# Patient Record
Sex: FEMALE | Race: WHITE | NOT HISPANIC OR LATINO
[De-identification: names, ages, dates, MRNs, and addresses within clinical notes are randomized per-mention and may not be internally consistent; named-entity substitution may affect disease eponyms.]

---

## 2017-10-20 ENCOUNTER — NON-APPOINTMENT (OUTPATIENT)
Age: 22
End: 2017-10-20

## 2017-10-20 ENCOUNTER — APPOINTMENT (OUTPATIENT)
Dept: ELECTROPHYSIOLOGY | Facility: CLINIC | Age: 22
End: 2017-10-20
Payer: COMMERCIAL

## 2017-10-20 VITALS — SYSTOLIC BLOOD PRESSURE: 143 MMHG | DIASTOLIC BLOOD PRESSURE: 82 MMHG | HEART RATE: 73 BPM

## 2017-10-20 PROCEDURE — 99215 OFFICE O/P EST HI 40 MIN: CPT

## 2017-10-20 PROCEDURE — 93000 ELECTROCARDIOGRAM COMPLETE: CPT

## 2017-12-04 ENCOUNTER — OUTPATIENT (OUTPATIENT)
Dept: OUTPATIENT SERVICES | Facility: HOSPITAL | Age: 22
LOS: 1 days | End: 2017-12-04
Payer: COMMERCIAL

## 2017-12-04 VITALS
TEMPERATURE: 98 F | HEART RATE: 68 BPM | DIASTOLIC BLOOD PRESSURE: 84 MMHG | WEIGHT: 139.99 LBS | RESPIRATION RATE: 18 BRPM | HEIGHT: 66 IN | SYSTOLIC BLOOD PRESSURE: 120 MMHG | OXYGEN SATURATION: 98 %

## 2017-12-04 DIAGNOSIS — I49.9 CARDIAC ARRHYTHMIA, UNSPECIFIED: ICD-10-CM

## 2017-12-04 DIAGNOSIS — Z01.818 ENCOUNTER FOR OTHER PREPROCEDURAL EXAMINATION: ICD-10-CM

## 2017-12-04 LAB
ALBUMIN SERPL ELPH-MCNC: 4.6 G/DL — SIGNIFICANT CHANGE UP (ref 3.3–5)
ALP SERPL-CCNC: 47 U/L — SIGNIFICANT CHANGE UP (ref 40–120)
ALT FLD-CCNC: 16 U/L RC — SIGNIFICANT CHANGE UP (ref 10–45)
ANION GAP SERPL CALC-SCNC: 11 MMOL/L — SIGNIFICANT CHANGE UP (ref 5–17)
APTT BLD: 29.5 SEC — SIGNIFICANT CHANGE UP (ref 27.5–37.4)
AST SERPL-CCNC: 19 U/L — SIGNIFICANT CHANGE UP (ref 10–40)
BILIRUB SERPL-MCNC: 0.3 MG/DL — SIGNIFICANT CHANGE UP (ref 0.2–1.2)
BLD GP AB SCN SERPL QL: NEGATIVE — SIGNIFICANT CHANGE UP
BUN SERPL-MCNC: 19 MG/DL — SIGNIFICANT CHANGE UP (ref 7–23)
CALCIUM SERPL-MCNC: 9.5 MG/DL — SIGNIFICANT CHANGE UP (ref 8.4–10.5)
CHLORIDE SERPL-SCNC: 101 MMOL/L — SIGNIFICANT CHANGE UP (ref 96–108)
CO2 SERPL-SCNC: 26 MMOL/L — SIGNIFICANT CHANGE UP (ref 22–31)
CREAT SERPL-MCNC: 0.68 MG/DL — SIGNIFICANT CHANGE UP (ref 0.5–1.3)
GLUCOSE SERPL-MCNC: 96 MG/DL — SIGNIFICANT CHANGE UP (ref 70–99)
HCG SERPL QL: NEGATIVE — SIGNIFICANT CHANGE UP
HCT VFR BLD CALC: 41.9 % — SIGNIFICANT CHANGE UP (ref 34.5–45)
HGB BLD-MCNC: 13.8 G/DL — SIGNIFICANT CHANGE UP (ref 11.5–15.5)
INR BLD: 1.03 RATIO — SIGNIFICANT CHANGE UP (ref 0.88–1.16)
MCHC RBC-ENTMCNC: 29.6 PG — SIGNIFICANT CHANGE UP (ref 27–34)
MCHC RBC-ENTMCNC: 32.9 GM/DL — SIGNIFICANT CHANGE UP (ref 32–36)
MCV RBC AUTO: 89.8 FL — SIGNIFICANT CHANGE UP (ref 80–100)
PLATELET # BLD AUTO: 278 K/UL — SIGNIFICANT CHANGE UP (ref 150–400)
POTASSIUM SERPL-MCNC: 4.3 MMOL/L — SIGNIFICANT CHANGE UP (ref 3.5–5.3)
POTASSIUM SERPL-SCNC: 4.3 MMOL/L — SIGNIFICANT CHANGE UP (ref 3.5–5.3)
PROT SERPL-MCNC: 7.8 G/DL — SIGNIFICANT CHANGE UP (ref 6–8.3)
PROTHROM AB SERPL-ACNC: 11.2 SEC — SIGNIFICANT CHANGE UP (ref 9.8–12.7)
RBC # BLD: 4.67 M/UL — SIGNIFICANT CHANGE UP (ref 3.8–5.2)
RBC # FLD: 12.7 % — SIGNIFICANT CHANGE UP (ref 10.3–14.5)
RH IG SCN BLD-IMP: POSITIVE — SIGNIFICANT CHANGE UP
SODIUM SERPL-SCNC: 138 MMOL/L — SIGNIFICANT CHANGE UP (ref 135–145)
WBC # BLD: 5.2 K/UL — SIGNIFICANT CHANGE UP (ref 3.8–10.5)
WBC # FLD AUTO: 5.2 K/UL — SIGNIFICANT CHANGE UP (ref 3.8–10.5)

## 2017-12-04 PROCEDURE — 93005 ELECTROCARDIOGRAM TRACING: CPT

## 2017-12-04 PROCEDURE — 84703 CHORIONIC GONADOTROPIN ASSAY: CPT

## 2017-12-04 PROCEDURE — 85027 COMPLETE CBC AUTOMATED: CPT

## 2017-12-04 PROCEDURE — 80053 COMPREHEN METABOLIC PANEL: CPT

## 2017-12-04 PROCEDURE — 86850 RBC ANTIBODY SCREEN: CPT

## 2017-12-04 PROCEDURE — 85610 PROTHROMBIN TIME: CPT

## 2017-12-04 PROCEDURE — 85730 THROMBOPLASTIN TIME PARTIAL: CPT

## 2017-12-04 PROCEDURE — 86901 BLOOD TYPING SEROLOGIC RH(D): CPT

## 2017-12-04 PROCEDURE — 86900 BLOOD TYPING SEROLOGIC ABO: CPT

## 2017-12-04 PROCEDURE — 93010 ELECTROCARDIOGRAM REPORT: CPT

## 2017-12-04 NOTE — H&P CARDIOLOGY - PMH
Ovarian cyst    SVT (supraventricular tachycardia) Ovarian cyst    Palpitation    SVT (supraventricular tachycardia)

## 2017-12-04 NOTE — H&P CARDIOLOGY - HISTORY OF PRESENT ILLNESS
22 year old female 22 year old female with PMHX of syncope presents for PST for WPW SVT ablation tomorrow. pt reports she had a syncopal episode about 3 years ago then having GUNTER and palpitation with working out few minutes, evaluated by Dr. Blanco and advised to have ablation. pt denies palpitation or chest pain currently.

## 2017-12-05 ENCOUNTER — TRANSCRIPTION ENCOUNTER (OUTPATIENT)
Age: 22
End: 2017-12-05

## 2017-12-05 ENCOUNTER — INPATIENT (INPATIENT)
Facility: HOSPITAL | Age: 22
LOS: 0 days | Discharge: HOME | DRG: 274 | End: 2017-12-06
Attending: INTERNAL MEDICINE | Admitting: INTERNAL MEDICINE
Payer: COMMERCIAL

## 2017-12-05 VITALS
SYSTOLIC BLOOD PRESSURE: 107 MMHG | TEMPERATURE: 98 F | RESPIRATION RATE: 16 BRPM | HEART RATE: 86 BPM | OXYGEN SATURATION: 93 % | DIASTOLIC BLOOD PRESSURE: 56 MMHG

## 2017-12-05 DIAGNOSIS — I49.9 CARDIAC ARRHYTHMIA, UNSPECIFIED: ICD-10-CM

## 2017-12-05 LAB
BLD GP AB SCN SERPL QL: NEGATIVE — SIGNIFICANT CHANGE UP
RH IG SCN BLD-IMP: POSITIVE — SIGNIFICANT CHANGE UP

## 2017-12-05 PROCEDURE — 93653 COMPRE EP EVAL TX SVT: CPT

## 2017-12-05 PROCEDURE — 93010 ELECTROCARDIOGRAM REPORT: CPT

## 2017-12-05 PROCEDURE — 93613 INTRACARDIAC EPHYS 3D MAPG: CPT

## 2017-12-05 NOTE — DISCHARGE NOTE ADULT - CARE PLAN
Principal Discharge DX:	SVT (supraventricular tachycardia)  Goal:	Pt will be free from lightheadedness, dizziness, chest pain, palpitations or SOB  Instructions for follow-up, activity and diet:	No heavy lifting more than 10lbs. No rigourous or strenuous activity for 24- 48 hours. Principal Discharge DX:	SVT (supraventricular tachycardia)  Goal:	Pt will be free from lightheadedness, dizziness, chest pain, palpitations or SOB  Instructions for follow-up, activity and diet:	S/P Ablation  No heavy lifting more than 10lbs. No rigourous or strenuous activity for 24- 48 hours. Avoid pools or bath x 1 week. May resume birth control contraceptives in one month post procedure.

## 2017-12-05 NOTE — DISCHARGE NOTE ADULT - CARE PROVIDER_API CALL
Raymon Blanco), Cardiac Electrophysiology; Cardiovascular Disease  [address], [place] [id number]  Phone: [phone]  Fax: [phone]

## 2017-12-05 NOTE — DISCHARGE NOTE ADULT - PLAN OF CARE
Pt will be free from lightheadedness, dizziness, chest pain, palpitations or SOB No heavy lifting more than 10lbs. No rigourous or strenuous activity for 24- 48 hours. S/P Ablation  No heavy lifting more than 10lbs. No rigourous or strenuous activity for 24- 48 hours. Avoid pools or bath x 1 week. May resume birth control contraceptives in one month post procedure.

## 2017-12-05 NOTE — DISCHARGE NOTE ADULT - HOSPITAL COURSE
Patient is a 22 year old female with PMHX of syncope presents for PST for WPW SVT ablation. Pt is now S/P SVT ablation. Pt tolerate procedure well. VS stable.  Telemetery revealed  Labs stable. Pt to follow up with Dr. Blanco (EP clinic) on 1/15/ 18 @ 11: 15 am. Patient is a 22 year old female with PMHX of syncope presents for PST for WPW SVT ablation. Pt is now S/P SVT ablation via Right groin. No bleeding or swelling noted. Pt tolerated procedure well and is now stable for discharge. VS stable. Labs stable.  Telemetry revealed SR with heart rates 70- 100's.  Post procedure  discharge instructions given. Pt to follow up with Dr. Blanco (EP clinic) on 1/15/ 18 @ 11: 15 am.

## 2017-12-05 NOTE — PATIENT PROFILE ADULT. - VISION (WITH CORRECTIVE LENSES IF THE PATIENT USUALLY WEARS THEM):
Normal vision: sees adequately in most situations; can see medication labels, newsprint/Contact lenses at bedside

## 2017-12-05 NOTE — DISCHARGE NOTE ADULT - CONDITIONS AT DISCHARGE
vss,  pt in no pain or distress, pt aware to seek immediate help if swelling/bleeding is noted at Cath site, or if right leg goes numb. right groin ablation site benign with no hematoma, no bleeding noted, extremity warm, mobile with positive pulses.

## 2017-12-05 NOTE — DISCHARGE NOTE ADULT - PATIENT PORTAL LINK FT
“You can access the [facility] Patient Portal, offered by , by registering with the following website: [email or web address]”

## 2017-12-06 VITALS
OXYGEN SATURATION: 98 % | SYSTOLIC BLOOD PRESSURE: 127 MMHG | DIASTOLIC BLOOD PRESSURE: 80 MMHG | TEMPERATURE: 99 F | RESPIRATION RATE: 19 BRPM | HEART RATE: 76 BPM

## 2017-12-06 DIAGNOSIS — I47.1 SUPRAVENTRICULAR TACHYCARDIA: ICD-10-CM

## 2017-12-06 LAB
ANION GAP SERPL CALC-SCNC: 11 MMOL/L — SIGNIFICANT CHANGE UP (ref 5–17)
BUN SERPL-MCNC: 13 MG/DL — SIGNIFICANT CHANGE UP (ref 7–23)
CALCIUM SERPL-MCNC: 9.2 MG/DL — SIGNIFICANT CHANGE UP (ref 8.4–10.5)
CHLORIDE SERPL-SCNC: 105 MMOL/L — SIGNIFICANT CHANGE UP (ref 96–108)
CO2 SERPL-SCNC: 24 MMOL/L — SIGNIFICANT CHANGE UP (ref 22–31)
CREAT SERPL-MCNC: 0.59 MG/DL — SIGNIFICANT CHANGE UP (ref 0.5–1.3)
GLUCOSE SERPL-MCNC: 101 MG/DL — HIGH (ref 70–99)
HCT VFR BLD CALC: 38.4 % — SIGNIFICANT CHANGE UP (ref 34.5–45)
HGB BLD-MCNC: 12.9 G/DL — SIGNIFICANT CHANGE UP (ref 11.5–15.5)
MCHC RBC-ENTMCNC: 30.4 PG — SIGNIFICANT CHANGE UP (ref 27–34)
MCHC RBC-ENTMCNC: 33.5 GM/DL — SIGNIFICANT CHANGE UP (ref 32–36)
MCV RBC AUTO: 90.5 FL — SIGNIFICANT CHANGE UP (ref 80–100)
PLATELET # BLD AUTO: 230 K/UL — SIGNIFICANT CHANGE UP (ref 150–400)
POTASSIUM SERPL-MCNC: 4.2 MMOL/L — SIGNIFICANT CHANGE UP (ref 3.5–5.3)
POTASSIUM SERPL-SCNC: 4.2 MMOL/L — SIGNIFICANT CHANGE UP (ref 3.5–5.3)
RBC # BLD: 4.24 M/UL — SIGNIFICANT CHANGE UP (ref 3.8–5.2)
RBC # FLD: 12.7 % — SIGNIFICANT CHANGE UP (ref 10.3–14.5)
SODIUM SERPL-SCNC: 140 MMOL/L — SIGNIFICANT CHANGE UP (ref 135–145)
WBC # BLD: 5.6 K/UL — SIGNIFICANT CHANGE UP (ref 3.8–10.5)
WBC # FLD AUTO: 5.6 K/UL — SIGNIFICANT CHANGE UP (ref 3.8–10.5)

## 2017-12-06 PROCEDURE — 93613 INTRACARDIAC EPHYS 3D MAPG: CPT

## 2017-12-06 PROCEDURE — 93005 ELECTROCARDIOGRAM TRACING: CPT

## 2017-12-06 PROCEDURE — C1894: CPT

## 2017-12-06 PROCEDURE — 93010 ELECTROCARDIOGRAM REPORT: CPT

## 2017-12-06 PROCEDURE — 93653 COMPRE EP EVAL TX SVT: CPT

## 2017-12-06 PROCEDURE — C1766: CPT

## 2017-12-06 PROCEDURE — C1732: CPT

## 2017-12-06 PROCEDURE — 99233 SBSQ HOSP IP/OBS HIGH 50: CPT

## 2017-12-06 PROCEDURE — C1730: CPT

## 2017-12-06 PROCEDURE — 86901 BLOOD TYPING SEROLOGIC RH(D): CPT

## 2017-12-06 PROCEDURE — 86900 BLOOD TYPING SEROLOGIC ABO: CPT

## 2017-12-06 PROCEDURE — 86850 RBC ANTIBODY SCREEN: CPT

## 2017-12-06 PROCEDURE — 80048 BASIC METABOLIC PNL TOTAL CA: CPT

## 2017-12-06 PROCEDURE — 85027 COMPLETE CBC AUTOMATED: CPT

## 2017-12-06 NOTE — PROGRESS NOTE ADULT - PROBLEM SELECTOR PLAN 1
S/P SVT ablation on 12/5  Monitor right groin for any bleeding or swelling  Post ablation discharge instructions   Discharge planning for today and pt to follow up with Dr. Blanco on 1/5/18 at 11: 05 am

## 2017-12-06 NOTE — PROGRESS NOTE ADULT - ASSESSMENT
Patient is a 22 year old female with PMHX of syncope presented for PST for WPW SVT ablation. Now s/p SVT ablation on 12/5, pt tolerated procedure well.

## 2017-12-06 NOTE — PROGRESS NOTE ADULT - SUBJECTIVE AND OBJECTIVE BOX
INTERVAL HPI/OVERNIGHT EVENTS: Resting comfortably in bed, no complaints voiced    MEDICATIONS  (STANDING):    MEDICATIONS  (PRN):      Allergies    penicillin (Anaphylaxis)    Intolerances      ROS:  General: Pt denies fever/chills  Cardiovascular: denies chest pain/palpitations/leg edema  Respiratory and Thorax: denies SOB/cough/wheezing  Gastrointestinal: denies abdominal pain/diarrhea/constipation/bloody stool  Musculoskeletal: denies joint pain or swelling, denies restricted motion  Skin: denies rashes/sores  Hematologic: denies abnormal bleeding    Vital Signs Last 24 Hrs  T(C): 36.6 (06 Dec 2017 04:27), Max: 36.7 (05 Dec 2017 19:59)  T(F): 97.9 (06 Dec 2017 04:27), Max: 98.1 (05 Dec 2017 19:59)  HR: 66 (06 Dec 2017 08:14) (66 - 88)  BP: 95/49 (06 Dec 2017 08:14) (95/49 - 123/66)  BP(mean): 74 (05 Dec 2017 16:30) (69 - 87)  RR: 18 (06 Dec 2017 04:27) (14 - 19)  SpO2: 98% (06 Dec 2017 04:27) (95% - 100%)      Physical Exam:  Neurological: Alert & Oriented x 3  Respiratory: CTA B/L, No wheezing/crackles/rhonchi  Cardiovascular: (+) S1 & S2  Gastrointestinal: soft, NT, nondistended, (+) BS  Extremities: No pedal edema, No clubbing, No cyanosis  Skin:  Right groin no bleeding or swelling    LABS:                        12.9   5.6   )-----------( 230      ( 06 Dec 2017 06:35 )             38.4     12-06    140  |  105  |  13  ----------------------------<  101<H>  4.2   |  24  |  0.59    Ca    9.2      06 Dec 2017 06:35    TPro  7.8  /  Alb  4.6  /  TBili  0.3  /  DBili  x   /  AST  19  /  ALT  16  /  AlkPhos  47  12-04    PT/INR - ( 04 Dec 2017 11:27 )   PT: 11.2 sec;   INR: 1.03 ratio         PTT - ( 04 Dec 2017 11:27 )  PTT:29.5 sec      RADIOLOGY & ADDITIONAL TESTS:    TELE: SR with heart rate 70- 100's    EKG: SR

## 2017-12-07 ENCOUNTER — TRANSCRIPTION ENCOUNTER (OUTPATIENT)
Age: 22
End: 2017-12-07

## 2017-12-18 ENCOUNTER — APPOINTMENT (OUTPATIENT)
Dept: ELECTROPHYSIOLOGY | Facility: CLINIC | Age: 22
End: 2017-12-18
Payer: COMMERCIAL

## 2017-12-18 ENCOUNTER — NON-APPOINTMENT (OUTPATIENT)
Age: 22
End: 2017-12-18

## 2017-12-18 VITALS — DIASTOLIC BLOOD PRESSURE: 74 MMHG | SYSTOLIC BLOOD PRESSURE: 116 MMHG | HEART RATE: 59 BPM

## 2017-12-18 PROCEDURE — 99215 OFFICE O/P EST HI 40 MIN: CPT

## 2017-12-18 PROCEDURE — 93000 ELECTROCARDIOGRAM COMPLETE: CPT

## 2017-12-18 RX ORDER — NORGESTIMATE AND ETHINYL ESTRADIOL 0.25-0.035
0.25-35 KIT ORAL
Refills: 0 | Status: DISCONTINUED | COMMUNITY
End: 2017-12-18

## 2017-12-26 ENCOUNTER — APPOINTMENT (OUTPATIENT)
Dept: CV DIAGNOSITCS | Facility: HOSPITAL | Age: 22
End: 2017-12-26

## 2017-12-26 ENCOUNTER — OUTPATIENT (OUTPATIENT)
Dept: OUTPATIENT SERVICES | Facility: HOSPITAL | Age: 22
LOS: 1 days | End: 2017-12-26
Payer: COMMERCIAL

## 2017-12-26 DIAGNOSIS — R00.2 PALPITATIONS: ICD-10-CM

## 2017-12-26 PROCEDURE — 93306 TTE W/DOPPLER COMPLETE: CPT

## 2017-12-26 PROCEDURE — 93306 TTE W/DOPPLER COMPLETE: CPT | Mod: 26

## 2018-01-05 ENCOUNTER — APPOINTMENT (OUTPATIENT)
Dept: ELECTROPHYSIOLOGY | Facility: CLINIC | Age: 23
End: 2018-01-05

## 2018-01-31 ENCOUNTER — RESULT REVIEW (OUTPATIENT)
Age: 23
End: 2018-01-31

## 2018-12-17 ENCOUNTER — TRANSCRIPTION ENCOUNTER (OUTPATIENT)
Age: 23
End: 2018-12-17

## 2018-12-17 ENCOUNTER — INPATIENT (INPATIENT)
Facility: HOSPITAL | Age: 23
LOS: 2 days | Discharge: HOME | DRG: 853 | End: 2018-12-20
Attending: SURGERY | Admitting: SURGERY
Payer: COMMERCIAL

## 2018-12-17 VITALS
OXYGEN SATURATION: 98 % | SYSTOLIC BLOOD PRESSURE: 114 MMHG | RESPIRATION RATE: 20 BRPM | WEIGHT: 149.91 LBS | HEART RATE: 104 BPM | DIASTOLIC BLOOD PRESSURE: 70 MMHG | HEIGHT: 66 IN | TEMPERATURE: 100 F

## 2018-12-17 PROCEDURE — 99285 EMERGENCY DEPT VISIT HI MDM: CPT | Mod: 25

## 2018-12-18 ENCOUNTER — RESULT REVIEW (OUTPATIENT)
Age: 23
End: 2018-12-18

## 2018-12-18 DIAGNOSIS — K37 UNSPECIFIED APPENDICITIS: ICD-10-CM

## 2018-12-18 PROBLEM — I47.1 SUPRAVENTRICULAR TACHYCARDIA: Chronic | Status: ACTIVE | Noted: 2017-12-04

## 2018-12-18 PROBLEM — R00.2 PALPITATIONS: Chronic | Status: ACTIVE | Noted: 2017-12-04

## 2018-12-18 LAB
ALBUMIN SERPL ELPH-MCNC: 4.9 G/DL — SIGNIFICANT CHANGE UP (ref 3.3–5)
ALP SERPL-CCNC: 55 U/L — SIGNIFICANT CHANGE UP (ref 40–120)
ALT FLD-CCNC: 16 U/L — SIGNIFICANT CHANGE UP (ref 10–45)
ANION GAP SERPL CALC-SCNC: 16 MMOL/L — SIGNIFICANT CHANGE UP (ref 5–17)
APTT BLD: 29.6 SEC — SIGNIFICANT CHANGE UP (ref 27.5–36.3)
AST SERPL-CCNC: 13 U/L — SIGNIFICANT CHANGE UP (ref 10–40)
BASOPHILS # BLD AUTO: 0 K/UL — SIGNIFICANT CHANGE UP (ref 0–0.2)
BASOPHILS NFR BLD AUTO: 0.2 % — SIGNIFICANT CHANGE UP (ref 0–2)
BILIRUB SERPL-MCNC: 0.5 MG/DL — SIGNIFICANT CHANGE UP (ref 0.2–1.2)
BLD GP AB SCN SERPL QL: NEGATIVE — SIGNIFICANT CHANGE UP
BUN SERPL-MCNC: 8 MG/DL — SIGNIFICANT CHANGE UP (ref 7–23)
CALCIUM SERPL-MCNC: 10 MG/DL — SIGNIFICANT CHANGE UP (ref 8.4–10.5)
CHLORIDE SERPL-SCNC: 95 MMOL/L — LOW (ref 96–108)
CO2 SERPL-SCNC: 23 MMOL/L — SIGNIFICANT CHANGE UP (ref 22–31)
CREAT SERPL-MCNC: 0.55 MG/DL — SIGNIFICANT CHANGE UP (ref 0.5–1.3)
EOSINOPHIL # BLD AUTO: 0 K/UL — SIGNIFICANT CHANGE UP (ref 0–0.5)
EOSINOPHIL NFR BLD AUTO: 0.3 % — SIGNIFICANT CHANGE UP (ref 0–6)
GAS PNL BLDV: SIGNIFICANT CHANGE UP
GLUCOSE BLDC GLUCOMTR-MCNC: 91 MG/DL — SIGNIFICANT CHANGE UP (ref 70–99)
GLUCOSE SERPL-MCNC: 108 MG/DL — HIGH (ref 70–99)
HCT VFR BLD CALC: 43.6 % — SIGNIFICANT CHANGE UP (ref 34.5–45)
HGB BLD-MCNC: 14.3 G/DL — SIGNIFICANT CHANGE UP (ref 11.5–15.5)
INR BLD: 1.14 RATIO — SIGNIFICANT CHANGE UP (ref 0.88–1.16)
LYMPHOCYTES # BLD AUTO: 1.3 K/UL — SIGNIFICANT CHANGE UP (ref 1–3.3)
LYMPHOCYTES # BLD AUTO: 8.1 % — LOW (ref 13–44)
MCHC RBC-ENTMCNC: 27.9 PG — SIGNIFICANT CHANGE UP (ref 27–34)
MCHC RBC-ENTMCNC: 32.8 GM/DL — SIGNIFICANT CHANGE UP (ref 32–36)
MCV RBC AUTO: 85.2 FL — SIGNIFICANT CHANGE UP (ref 80–100)
MONOCYTES # BLD AUTO: 1.3 K/UL — HIGH (ref 0–0.9)
MONOCYTES NFR BLD AUTO: 8.2 % — SIGNIFICANT CHANGE UP (ref 2–14)
NEUTROPHILS # BLD AUTO: 13 K/UL — HIGH (ref 1.8–7.4)
NEUTROPHILS NFR BLD AUTO: 83.3 % — HIGH (ref 43–77)
PLATELET # BLD AUTO: 295 K/UL — SIGNIFICANT CHANGE UP (ref 150–400)
POTASSIUM SERPL-MCNC: 3.8 MMOL/L — SIGNIFICANT CHANGE UP (ref 3.5–5.3)
POTASSIUM SERPL-SCNC: 3.8 MMOL/L — SIGNIFICANT CHANGE UP (ref 3.5–5.3)
PROT SERPL-MCNC: 8.4 G/DL — HIGH (ref 6–8.3)
PROTHROM AB SERPL-ACNC: 13 SEC — HIGH (ref 10–12.9)
RBC # BLD: 5.11 M/UL — SIGNIFICANT CHANGE UP (ref 3.8–5.2)
RBC # FLD: 12.2 % — SIGNIFICANT CHANGE UP (ref 10.3–14.5)
RH IG SCN BLD-IMP: POSITIVE — SIGNIFICANT CHANGE UP
SODIUM SERPL-SCNC: 134 MMOL/L — LOW (ref 135–145)
WBC # BLD: 15.6 K/UL — HIGH (ref 3.8–10.5)
WBC # FLD AUTO: 15.6 K/UL — HIGH (ref 3.8–10.5)

## 2018-12-18 PROCEDURE — 44970 LAPAROSCOPY APPENDECTOMY: CPT

## 2018-12-18 PROCEDURE — 74177 CT ABD & PELVIS W/CONTRAST: CPT | Mod: 26

## 2018-12-18 RX ORDER — SODIUM CHLORIDE 9 MG/ML
1000 INJECTION, SOLUTION INTRAVENOUS
Qty: 0 | Refills: 0 | Status: DISCONTINUED | OUTPATIENT
Start: 2018-12-18 | End: 2018-12-18

## 2018-12-18 RX ORDER — ONDANSETRON 8 MG/1
4 TABLET, FILM COATED ORAL ONCE
Qty: 0 | Refills: 0 | Status: COMPLETED | OUTPATIENT
Start: 2018-12-18 | End: 2018-12-18

## 2018-12-18 RX ORDER — CIPROFLOXACIN LACTATE 400MG/40ML
400 VIAL (ML) INTRAVENOUS EVERY 12 HOURS
Qty: 0 | Refills: 0 | Status: DISCONTINUED | OUTPATIENT
Start: 2018-12-18 | End: 2018-12-18

## 2018-12-18 RX ORDER — ACETAMINOPHEN 500 MG
1000 TABLET ORAL ONCE
Qty: 0 | Refills: 0 | Status: COMPLETED | OUTPATIENT
Start: 2018-12-18 | End: 2018-12-18

## 2018-12-18 RX ORDER — METRONIDAZOLE 500 MG
500 TABLET ORAL EVERY 8 HOURS
Qty: 0 | Refills: 0 | Status: DISCONTINUED | OUTPATIENT
Start: 2018-12-18 | End: 2018-12-20

## 2018-12-18 RX ORDER — HYDROMORPHONE HYDROCHLORIDE 2 MG/ML
0.25 INJECTION INTRAMUSCULAR; INTRAVENOUS; SUBCUTANEOUS
Qty: 0 | Refills: 0 | Status: DISCONTINUED | OUTPATIENT
Start: 2018-12-18 | End: 2018-12-19

## 2018-12-18 RX ORDER — MORPHINE SULFATE 50 MG/1
4 CAPSULE, EXTENDED RELEASE ORAL EVERY 4 HOURS
Qty: 0 | Refills: 0 | Status: DISCONTINUED | OUTPATIENT
Start: 2018-12-18 | End: 2018-12-18

## 2018-12-18 RX ORDER — ONDANSETRON 8 MG/1
4 TABLET, FILM COATED ORAL ONCE
Qty: 0 | Refills: 0 | Status: DISCONTINUED | OUTPATIENT
Start: 2018-12-18 | End: 2018-12-18

## 2018-12-18 RX ORDER — ACETAMINOPHEN 500 MG
975 TABLET ORAL EVERY 6 HOURS
Qty: 0 | Refills: 0 | Status: DISCONTINUED | OUTPATIENT
Start: 2018-12-18 | End: 2018-12-20

## 2018-12-18 RX ORDER — CIPROFLOXACIN LACTATE 400MG/40ML
400 VIAL (ML) INTRAVENOUS EVERY 12 HOURS
Qty: 0 | Refills: 0 | Status: DISCONTINUED | OUTPATIENT
Start: 2018-12-18 | End: 2018-12-20

## 2018-12-18 RX ORDER — HEPARIN SODIUM 5000 [USP'U]/ML
5000 INJECTION INTRAVENOUS; SUBCUTANEOUS EVERY 8 HOURS
Qty: 0 | Refills: 0 | Status: DISCONTINUED | OUTPATIENT
Start: 2018-12-18 | End: 2018-12-18

## 2018-12-18 RX ORDER — ACETAMINOPHEN 500 MG
650 TABLET ORAL ONCE
Qty: 0 | Refills: 0 | Status: COMPLETED | OUTPATIENT
Start: 2018-12-18 | End: 2018-12-18

## 2018-12-18 RX ORDER — SODIUM CHLORIDE 9 MG/ML
1000 INJECTION, SOLUTION INTRAVENOUS
Qty: 0 | Refills: 0 | Status: DISCONTINUED | OUTPATIENT
Start: 2018-12-18 | End: 2018-12-19

## 2018-12-18 RX ORDER — MORPHINE SULFATE 50 MG/1
2 CAPSULE, EXTENDED RELEASE ORAL EVERY 4 HOURS
Qty: 0 | Refills: 0 | Status: DISCONTINUED | OUTPATIENT
Start: 2018-12-18 | End: 2018-12-18

## 2018-12-18 RX ORDER — CIPROFLOXACIN LACTATE 400MG/40ML
400 VIAL (ML) INTRAVENOUS ONCE
Qty: 0 | Refills: 0 | Status: COMPLETED | OUTPATIENT
Start: 2018-12-18 | End: 2018-12-18

## 2018-12-18 RX ORDER — ENOXAPARIN SODIUM 100 MG/ML
40 INJECTION SUBCUTANEOUS DAILY
Qty: 0 | Refills: 0 | Status: DISCONTINUED | OUTPATIENT
Start: 2018-12-18 | End: 2018-12-20

## 2018-12-18 RX ORDER — METRONIDAZOLE 500 MG
500 TABLET ORAL EVERY 8 HOURS
Qty: 0 | Refills: 0 | Status: DISCONTINUED | OUTPATIENT
Start: 2018-12-18 | End: 2018-12-18

## 2018-12-18 RX ORDER — SODIUM CHLORIDE 9 MG/ML
1000 INJECTION INTRAMUSCULAR; INTRAVENOUS; SUBCUTANEOUS ONCE
Qty: 0 | Refills: 0 | Status: COMPLETED | OUTPATIENT
Start: 2018-12-18 | End: 2018-12-18

## 2018-12-18 RX ADMIN — Medication 100 MILLIGRAM(S): at 21:08

## 2018-12-18 RX ADMIN — MORPHINE SULFATE 2 MILLIGRAM(S): 50 CAPSULE, EXTENDED RELEASE ORAL at 09:03

## 2018-12-18 RX ADMIN — ONDANSETRON 4 MILLIGRAM(S): 8 TABLET, FILM COATED ORAL at 03:08

## 2018-12-18 RX ADMIN — HYDROMORPHONE HYDROCHLORIDE 0.25 MILLIGRAM(S): 2 INJECTION INTRAMUSCULAR; INTRAVENOUS; SUBCUTANEOUS at 20:48

## 2018-12-18 RX ADMIN — SODIUM CHLORIDE 75 MILLILITER(S): 9 INJECTION, SOLUTION INTRAVENOUS at 20:30

## 2018-12-18 RX ADMIN — HYDROMORPHONE HYDROCHLORIDE 0.25 MILLIGRAM(S): 2 INJECTION INTRAMUSCULAR; INTRAVENOUS; SUBCUTANEOUS at 21:05

## 2018-12-18 RX ADMIN — HYDROMORPHONE HYDROCHLORIDE 0.25 MILLIGRAM(S): 2 INJECTION INTRAMUSCULAR; INTRAVENOUS; SUBCUTANEOUS at 19:00

## 2018-12-18 RX ADMIN — Medication 400 MILLIGRAM(S): at 12:05

## 2018-12-18 RX ADMIN — SODIUM CHLORIDE 1000 MILLILITER(S): 9 INJECTION INTRAMUSCULAR; INTRAVENOUS; SUBCUTANEOUS at 05:50

## 2018-12-18 RX ADMIN — MORPHINE SULFATE 2 MILLIGRAM(S): 50 CAPSULE, EXTENDED RELEASE ORAL at 09:41

## 2018-12-18 RX ADMIN — Medication 100 MILLIGRAM(S): at 07:08

## 2018-12-18 RX ADMIN — Medication 1000 MILLIGRAM(S): at 12:20

## 2018-12-18 RX ADMIN — Medication 200 MILLIGRAM(S): at 20:07

## 2018-12-18 RX ADMIN — Medication 650 MILLIGRAM(S): at 04:01

## 2018-12-18 RX ADMIN — Medication 200 MILLIGRAM(S): at 08:42

## 2018-12-18 RX ADMIN — SODIUM CHLORIDE 1000 MILLILITER(S): 9 INJECTION INTRAMUSCULAR; INTRAVENOUS; SUBCUTANEOUS at 03:08

## 2018-12-18 RX ADMIN — HYDROMORPHONE HYDROCHLORIDE 0.25 MILLIGRAM(S): 2 INJECTION INTRAMUSCULAR; INTRAVENOUS; SUBCUTANEOUS at 18:45

## 2018-12-18 RX ADMIN — Medication 650 MILLIGRAM(S): at 05:50

## 2018-12-18 NOTE — H&P ADULT - ASSESSMENT
Patient is 22 yo female in relative good health s/p ablation for WPW presented with 2 weeks of left sided abdominal pain then sudden worsening of pain localized to right side for 1 day associated with fever, nausea and vomiting; found to have left ovarian cyst along with uncomplicated acute appendicitis on CT scan and leukocytosis    Plan:   Admit patient to acute care surgery  IV antibioitics were given (Flagyl and Cipro due to allergy) in the ER   Patient is added on for laparoscopic appendectomy, pre-op labs and consent

## 2018-12-18 NOTE — H&P ADULT - HISTORY OF PRESENT ILLNESS
Patient is 22 yo female with WPW s/p ablation by Dr. Blanco (patient's cardiologist) presented with abdominal pain. Patient states that her pain started 2 weeks ago but localized to the left abdomen, intermittent. Patient presented to urgent care when pain first started and was diagnosed with UTI and was given Bactrim. Patient states that her pain hasn't completely resolved but suddenly got much worse yesterday. She states that "the spot of the pain changed". She localizes the worsening pain more to the lower abdomen. Patient also endorses fever, nausea, vomiting with her worsening pain also started yesterday. Patient's last PO was 1 day ago. Patient's last menstrual cycle ended about 2 weeks ago.

## 2018-12-18 NOTE — BRIEF OPERATIVE NOTE - PROCEDURE
<<-----Click on this checkbox to enter Procedure Laparoscopic appendectomy  12/18/2018    Active  DLIA

## 2018-12-18 NOTE — ED PROVIDER NOTE - MEDICAL DECISION MAKING DETAILS
23F lower abdominal pain, TTP at McBurney's point with +Rovsings sign. CT a/p rule out appendicitis. Labs, urine pregnancy. Outpatient TVUS negative as per pt. Pain control. IVF.

## 2018-12-18 NOTE — BRIEF OPERATIVE NOTE - OPERATION/FINDINGS
Appendix noted to be significantly inflamed at adherent to RLQ abdominal wall, and wrapped on itself, adherent to cecum. Dissected free bluntly, with perforation in mid-appendix. Mesoappendix taken with ligasure. Appendix stapled across at the base with purple load.

## 2018-12-18 NOTE — ED PROVIDER NOTE - OBJECTIVE STATEMENT
22yo female pmh SVT s/p ablation p/w lower abdominal pain x 1-2 weeks. Prescribed Bactrim for UTI, culture was found to later be negative. TVUS negative last week as per pt. 1 episode vomiting yesterday, developed fevers, worsening abdominal pain. Denies urinary symptoms, vaginal discharge, diarrhea, chest pain, dyspnea, rashes, recent travel or sick contacts, cough, congestion.

## 2018-12-18 NOTE — ED ADULT NURSE REASSESSMENT NOTE - NS ED NURSE REASSESS COMMENT FT1
Report received from SARIKA Ontiveros. Pt is well-appearing, breathing unlabored on room air resting in stretcher at this time. Pt denies chest pain, shortness of breath, nausea. Pt is complaining of 8/10 pain to the RLQ, declines pain medication at this time. 20 G IV to left AC, no signs of infection/infiltration. ABX infusing as per MD order. Educated pt on plan of care. Pt states her family will be arriving soon. Belongings in bags at bedside. Safety and comfort maintained, call bell within reach.

## 2018-12-18 NOTE — ED ADULT NURSE REASSESSMENT NOTE - NS ED NURSE REASSESS COMMENT FT1
Pt is breathing unlabored on room air, resting comfortably in stretcher with mother at bedside. Pt reports relief in abdominal pain, 5/10 since morphine administered as per MD order. Pt assisted to bathroom with steady gait and non-skid socks on. ABX infusing as per MD order at this time.  Pt awaiting surgery.

## 2018-12-18 NOTE — H&P ADULT - ATTENDING COMMENTS
Acute appendicitis with sepsis present on admission (fever to 101, WBC 15K). Taken to OR. Small perforation with scant pus found.

## 2018-12-18 NOTE — ED ADULT NURSE NOTE - NSIMPLEMENTINTERV_GEN_ALL_ED
Implemented All Universal Safety Interventions:  [place] to call system. Call bell, personal items and telephone within reach. Instruct patient to call for assistance. Room bathroom lighting operational. Non-slip footwear when patient is off stretcher. Physically safe environment: no spills, clutter or unnecessary equipment. Stretcher in lowest position, wheels locked, appropriate side rails in place.

## 2018-12-18 NOTE — CHART NOTE - NSCHARTNOTEFT_GEN_A_CORE
Surgery Post op Note: [facility]     Procedure: Laparoscopic Appendectomy 12/18/18    SUBJECTIVE: Patient seen and evaluated at the bedside.   SOB:  [ ] YES [ ] NO  Chest Discomfort: [ ] YES [ ] NO    Nausea: [ ] YES [ ] NO           Vomiting: [ ] YES [ ] NO  Flatus: [ ] YES [ ] NO             Bowel Movement: [ ] YES [ ] NO  Void: [ ]YES [ ]No         Pain Control Adequate: [ ] YES [ ] NO    Objective:   Vital Signs Last 24 Hrs  T(C): 36.3 (18 Dec 2018 17:35), Max: 38.1 (18 Dec 2018 01:39)  T(F): 97.3 (18 Dec 2018 17:35), Max: 100.5 (18 Dec 2018 01:39)  HR: 100 (18 Dec 2018 18:30) (74 - 104)  BP: 131/60 (18 Dec 2018 18:30) (105/63 - 131/69)  BP(mean): 87 (18 Dec 2018 18:30) (78 - 91)  RR: 14 (18 Dec 2018 18:30) (14 - 20)  SpO2: 96% (18 Dec 2018 18:30) (96% - 100%)  I&O's Summary    I&O's Detail        LABS:                        14.3   15.6  )-----------( 295      ( 18 Dec 2018 03:16 )             43.6     12-18    134<L>  |  95<L>  |  8   ----------------------------<  108<H>  3.8   |  23  |  0.55    Ca    10.0      18 Dec 2018 03:16    TPro  8.4<H>  /  Alb  4.9  /  TBili  0.5  /  DBili  x   /  AST  13  /  ALT  16  /  AlkPhos  55  12-18    PT/INR - ( 18 Dec 2018 07:14 )   PT: 13.0 sec;   INR: 1.14 ratio         PTT - ( 18 Dec 2018 07:14 )  PTT:29.6 sec      MEDICATIONS  (STANDING):  ciprofloxacin   IVPB 400 milliGRAM(s) IV Intermittent every 12 hours  enoxaparin Injectable 40 milliGRAM(s) SubCutaneous daily  metroNIDAZOLE  IVPB 500 milliGRAM(s) IV Intermittent every 8 hours    MEDICATIONS  (PRN):  acetaminophen   Tablet .. 975 milliGRAM(s) Oral every 6 hours PRN Mild Pain (1 - 3), Moderate Pain (4 - 6)  HYDROmorphone  Injectable 0.25 milliGRAM(s) IV Push every 10 minutes PRN Moderate Pain (4 - 6)      Physical exam  General:   Respiratory:   Abdomen:      Assessment/Plan: 23y Female  s/p  2 yo female in relative good health s/p ablation for WPW presented with 2 weeks of left sided abdominal pain admitted with acute appendicitis now POD #0 laparoscopic appendectomy.    - Diet: clear liquids  - Activity- OOB with assistance  - Labs: f/u AM labs   - Pain medication as needed   - DVT ppx: Lovenox 40 Q 24 hours   - incentive spirometry   - c/w Marielena SpiveyC   p [id number] [facility] Surgery Post op Note: [facility]     Procedure: Laparoscopic Appendectomy 12/18/18    SUBJECTIVE: Patient seen and evaluated at the bedside in PACU at 10 PM. Patient states that she is feeling better then before the surgery. Has slight pain in the abdomen. Endorsed slight nausea earlier in the night with morphine, but no longer having any more nausea.   SOB:  [ ] YES [x ] NO  Chest Discomfort: [ ] YES [x ] NO    Nausea: [ ] YES [x ] NO           Vomiting: [ ] YES [x ] NO  Flatus: [ ] YES [x ] NO             Bowel Movement: [ ] YES [ x] NO  Void: [ ]YES [ x]No         Pain Control Adequate: [x ] YES [ ] NO    Objective:   ICU Vital Signs Last 24 Hrs  T(C): 36.3 (18 Dec 2018 17:35), Max: 38.1 (18 Dec 2018 01:39)  T(F): 97.3 (18 Dec 2018 17:35), Max: 100.5 (18 Dec 2018 01:39)  HR: 102 (18 Dec 2018 22:00) (74 - 102)  BP: 113/58 (18 Dec 2018 22:00) (105/63 - 133/67)  BP(mean): 80 (18 Dec 2018 22:00) (78 - 93)  ABP: --  ABP(mean): --  RR: 15 (18 Dec 2018 22:00) (14 - 19)  SpO2: 99% (18 Dec 2018 22:00) (94% - 100%)    I&O's Summary    I&O's Detail        LABS:                        14.3   15.6  )-----------( 295      ( 18 Dec 2018 03:16 )             43.6     12-18    134<L>  |  95<L>  |  8   ----------------------------<  108<H>  3.8   |  23  |  0.55    Ca    10.0      18 Dec 2018 03:16    TPro  8.4<H>  /  Alb  4.9  /  TBili  0.5  /  DBili  x   /  AST  13  /  ALT  16  /  AlkPhos  55  12-18    PT/INR - ( 18 Dec 2018 07:14 )   PT: 13.0 sec;   INR: 1.14 ratio         PTT - ( 18 Dec 2018 07:14 )  PTT:29.6 sec      MEDICATIONS  (STANDING):  ciprofloxacin   IVPB 400 milliGRAM(s) IV Intermittent every 12 hours  enoxaparin Injectable 40 milliGRAM(s) SubCutaneous daily  metroNIDAZOLE  IVPB 500 milliGRAM(s) IV Intermittent every 8 hours    MEDICATIONS  (PRN):  acetaminophen   Tablet .. 975 milliGRAM(s) Oral every 6 hours PRN Mild Pain (1 - 3), Moderate Pain (4 - 6)  HYDROmorphone  Injectable 0.25 milliGRAM(s) IV Push every 10 minutes PRN Moderate Pain (4 - 6)      Physical exam  General: NAD, AAO x 3, laying in bed comfortably  Respiratory: non labored breathing   Abdomen:  soft, appropriately tender to palpation, incision site dressing c/d/i    Assessment/Plan:   22 yo female in relative good health s/p ablation for WPW presented with 2 weeks of left sided abdominal pain admitted with acute appendicitis now POD #0 laparoscopic appendectomy.    - Diet: clear liquids  - Activity- OOB with assistance  - Labs: f/u AM labs   - Pain medication as needed   - DVT ppx: Lovenox 40 Q 24 hours   - incentive spirometry   - c/w Marielena SpiveyC   p [id number] [facility] Surgery Post op Note: [facility]     Procedure: Laparoscopic Appendectomy 12/18/18    SUBJECTIVE: Patient seen and evaluated at the bedside in PACU at 10 PM. Patient states that she is feeling better then before the surgery. Has slight pain in the abdomen. Endorsed slight nausea earlier in the night with morphine, but no longer having any more nausea.   SOB:  [ ] YES [x ] NO  Chest Discomfort: [ ] YES [x ] NO    Nausea: [ ] YES [x ] NO           Vomiting: [ ] YES [x ] NO  Flatus: [ ] YES [x ] NO             Bowel Movement: [ ] YES [ x] NO  Void: [ ]YES [ x]No         Pain Control Adequate: [x ] YES [ ] NO    Objective:   ICU Vital Signs Last 24 Hrs  T(C): 36.3 (18 Dec 2018 17:35), Max: 38.1 (18 Dec 2018 01:39)  T(F): 97.3 (18 Dec 2018 17:35), Max: 100.5 (18 Dec 2018 01:39)  HR: 102 (18 Dec 2018 22:00) (74 - 102)  BP: 113/58 (18 Dec 2018 22:00) (105/63 - 133/67)  BP(mean): 80 (18 Dec 2018 22:00) (78 - 93)  ABP: --  ABP(mean): --  RR: 15 (18 Dec 2018 22:00) (14 - 19)  SpO2: 99% (18 Dec 2018 22:00) (94% - 100%)    I&O's Summary    I&O's Detail        LABS:                        14.3   15.6  )-----------( 295      ( 18 Dec 2018 03:16 )             43.6     12-18    134<L>  |  95<L>  |  8   ----------------------------<  108<H>  3.8   |  23  |  0.55    Ca    10.0      18 Dec 2018 03:16    TPro  8.4<H>  /  Alb  4.9  /  TBili  0.5  /  DBili  x   /  AST  13  /  ALT  16  /  AlkPhos  55  12-18    PT/INR - ( 18 Dec 2018 07:14 )   PT: 13.0 sec;   INR: 1.14 ratio         PTT - ( 18 Dec 2018 07:14 )  PTT:29.6 sec      MEDICATIONS  (STANDING):  ciprofloxacin   IVPB 400 milliGRAM(s) IV Intermittent every 12 hours  enoxaparin Injectable 40 milliGRAM(s) SubCutaneous daily  metroNIDAZOLE  IVPB 500 milliGRAM(s) IV Intermittent every 8 hours    MEDICATIONS  (PRN):  acetaminophen   Tablet .. 975 milliGRAM(s) Oral every 6 hours PRN Mild Pain (1 - 3), Moderate Pain (4 - 6)  HYDROmorphone  Injectable 0.25 milliGRAM(s) IV Push every 10 minutes PRN Moderate Pain (4 - 6)      Physical exam  General: NAD, AAO x 3, laying in bed comfortably  Respiratory: non labored breathing   Abdomen:  soft, appropriately tender to palpation, incision site dressing c/d/i    Assessment/Plan:   24 yo female in relative good health s/p ablation for WPW presented with 2 weeks of left sided abdominal pain admitted with acute appendicitis now POD #0 laparoscopic appendectomy.    - Diet: clear liquids  - Activity- OOB with assistance  - Labs: f/u AM labs   - Pain medication as needed   - DVT ppx: Lovenox 40 Q 24 hours   - incentive spirometry   - c/w Cipro Flagyl  - f/u TOV      Riya Medel PA-C   p [id number] [facility]

## 2018-12-18 NOTE — ED PROVIDER NOTE - ATTENDING CONTRIBUTION TO CARE
23F, pmh svt s/p ablation, presents with lower abd pain x 1-2 weeks. Pain lower abdomen, worse on R side, worsening today. Nausea, one episode nbnb vomiting yesterday. Pain now mod-severe. Pt was evaluated last week, rx'd bactrim for uti, with TVUS performed as well which was neg. Now with subjective fevers and chills. Denies diarrhea, melena, hematochezia, vag d/c, dysuria, hematuria.    PE: NAD, NCAT, MMM, Trachea midline, Normal conjunctiva, lungs CTAB, S1/S2 RRR, Normal perfusion, 2+ radial pulses bilat, Abdomen Soft, +RLQ ttp, +Rovsing sign, No rebound/guarding, No LE edema, No deformity of extremities, No rashes,  No focal motor or sensory deficits.    Pt febrile. Exam very concerning for acute intra-abd surgical pathology, specifically appendicitis. Check labs, ua, u preg, ct a/p. Analgesia, IVF. Re-eval. - Naldo Milligan MD

## 2018-12-18 NOTE — H&P ADULT - NSHPLABSRESULTS_GEN_ALL_CORE
CBC (12-18 @ 03:16)                          14.3                     15.6<H>  )--------------(  295        83.3<H>% Neuts, 8.1<L>% Lymphs, ANC: 13.0<H>                          43.6      BMP (12-18 @ 03:16)       134<L>  |  95<L>   |  8     			Ca++ --      Ca 10.0         ---------------------------------( 108<H>		Mg --           3.8     |  23      |  0.55  			Ph --        LFTs (12-18 @ 03:16)      TPro 8.4<H> / Alb 4.9 / TBili 0.5 / DBili -- / AST 13 / ALT 16 / AlkPhos 55    Coags (12-18 @ 07:14)  aPTT 29.6 / INR 1.14 / PT 13.0<H>    VBG (12-18 @ 03:16)     7.41 / 44 / 30 / 27 / 2.8<H> / 49<L>%      Lactate: 1.2    EXAM:  CT ABDOMEN AND PELVIS OC IC                        Pelvis: The endometrium is thickened measuring up to 8 mm which likely   correlates with the phase of the patient's menstrual cycle. A 1.9 x 1.3   cm left ovarian cyst versus dominant follicle is noted. Right ovary and   urinary bladder are unremarkable. Trace free pelvic fluid is noted.    Gastrointestinal tract: The appendix is dilated measuring up to 1.0 cm   with moderate periappendiceal inflammatory change compatible with an   acute appendicitis. No adjacent focal fluid collection is seen to suggest   an abscess. There is no evidence of a bowel obstruction.

## 2018-12-18 NOTE — ED ADULT NURSE NOTE - OBJECTIVE STATEMENT
24 yo f reporting to ED for abdominal pain. PMH of WPW s/p ablation last year. Pt reporting worsening abdominal pain x2 weeks. Pt had US through OB/GYN to evaluate ovaries with no abnormalities. Pt reporting 1 day of nausea & fever (max temperature of 101.5 F). Pt reported to Urgent Care and was told "to come to ED to be evaluated for appendicitis". Pt reporting of 8/10 "sharp" bilateral lower abdominal pain that radiates to the lower back. Pt has had nausea, but no vomiting. LMP began one week ago. Last bowel movement was 3 days ago. Pt AAOx3, NAD, lungs clear bilat, abdomen soft, distended, tender in the RLQ & MLQ, strong peripheral pulses x 4, cap refill < 2 seconds, skin warm and dry. Pt denies headache, dizziness, chest pain, palpitations, cough, SOB, vomiting, diarrhea, blood in the stool, increased frequency of urination, burning on urination, hematuria, weakness at this time. Father at bedside. Safety & comfort measures maintained. Will continue to reassess.

## 2018-12-19 ENCOUNTER — TRANSCRIPTION ENCOUNTER (OUTPATIENT)
Age: 23
End: 2018-12-19

## 2018-12-19 LAB
ANION GAP SERPL CALC-SCNC: 13 MMOL/L — SIGNIFICANT CHANGE UP (ref 5–17)
BUN SERPL-MCNC: 6 MG/DL — LOW (ref 7–23)
CALCIUM SERPL-MCNC: 8.4 MG/DL — SIGNIFICANT CHANGE UP (ref 8.4–10.5)
CHLORIDE SERPL-SCNC: 99 MMOL/L — SIGNIFICANT CHANGE UP (ref 96–108)
CO2 SERPL-SCNC: 23 MMOL/L — SIGNIFICANT CHANGE UP (ref 22–31)
CREAT SERPL-MCNC: 0.45 MG/DL — LOW (ref 0.5–1.3)
GLUCOSE SERPL-MCNC: 153 MG/DL — HIGH (ref 70–99)
HCT VFR BLD CALC: 36.9 % — SIGNIFICANT CHANGE UP (ref 34.5–45)
HGB BLD-MCNC: 12.2 G/DL — SIGNIFICANT CHANGE UP (ref 11.5–15.5)
MAGNESIUM SERPL-MCNC: 2.1 MG/DL — SIGNIFICANT CHANGE UP (ref 1.6–2.6)
MCHC RBC-ENTMCNC: 28.6 PG — SIGNIFICANT CHANGE UP (ref 27–34)
MCHC RBC-ENTMCNC: 33.1 GM/DL — SIGNIFICANT CHANGE UP (ref 32–36)
MCV RBC AUTO: 86.3 FL — SIGNIFICANT CHANGE UP (ref 80–100)
PHOSPHATE SERPL-MCNC: 2.2 MG/DL — LOW (ref 2.5–4.5)
PLATELET # BLD AUTO: 257 K/UL — SIGNIFICANT CHANGE UP (ref 150–400)
POTASSIUM SERPL-MCNC: 4 MMOL/L — SIGNIFICANT CHANGE UP (ref 3.5–5.3)
POTASSIUM SERPL-SCNC: 4 MMOL/L — SIGNIFICANT CHANGE UP (ref 3.5–5.3)
RBC # BLD: 4.28 M/UL — SIGNIFICANT CHANGE UP (ref 3.8–5.2)
RBC # FLD: 12.1 % — SIGNIFICANT CHANGE UP (ref 10.3–14.5)
SODIUM SERPL-SCNC: 135 MMOL/L — SIGNIFICANT CHANGE UP (ref 135–145)
WBC # BLD: 17.7 K/UL — HIGH (ref 3.8–10.5)
WBC # FLD AUTO: 17.7 K/UL — HIGH (ref 3.8–10.5)

## 2018-12-19 PROCEDURE — 99024 POSTOP FOLLOW-UP VISIT: CPT

## 2018-12-19 RX ORDER — OXYCODONE HYDROCHLORIDE 5 MG/1
10 TABLET ORAL EVERY 4 HOURS
Qty: 0 | Refills: 0 | Status: DISCONTINUED | OUTPATIENT
Start: 2018-12-19 | End: 2018-12-20

## 2018-12-19 RX ORDER — SODIUM,POTASSIUM PHOSPHATES 278-250MG
1 POWDER IN PACKET (EA) ORAL ONCE
Qty: 0 | Refills: 0 | Status: DISCONTINUED | OUTPATIENT
Start: 2018-12-19 | End: 2018-12-20

## 2018-12-19 RX ORDER — OXYCODONE HYDROCHLORIDE 5 MG/1
5 TABLET ORAL EVERY 4 HOURS
Qty: 0 | Refills: 0 | Status: DISCONTINUED | OUTPATIENT
Start: 2018-12-19 | End: 2018-12-20

## 2018-12-19 RX ORDER — OXYCODONE HYDROCHLORIDE 5 MG/1
1 TABLET ORAL
Qty: 12 | Refills: 0 | OUTPATIENT
Start: 2018-12-19 | End: 2018-12-21

## 2018-12-19 RX ORDER — OXYCODONE HYDROCHLORIDE 5 MG/1
5 TABLET ORAL ONCE
Qty: 0 | Refills: 0 | Status: DISCONTINUED | OUTPATIENT
Start: 2018-12-19 | End: 2018-12-19

## 2018-12-19 RX ORDER — ACETAMINOPHEN 500 MG
1000 TABLET ORAL ONCE
Qty: 0 | Refills: 0 | Status: COMPLETED | OUTPATIENT
Start: 2018-12-19 | End: 2018-12-19

## 2018-12-19 RX ORDER — ONDANSETRON 8 MG/1
4 TABLET, FILM COATED ORAL ONCE
Qty: 0 | Refills: 0 | Status: COMPLETED | OUTPATIENT
Start: 2018-12-19 | End: 2018-12-19

## 2018-12-19 RX ORDER — ACETAMINOPHEN 500 MG
3 TABLET ORAL
Qty: 0 | Refills: 0 | COMMUNITY
Start: 2018-12-19

## 2018-12-19 RX ADMIN — Medication 975 MILLIGRAM(S): at 07:00

## 2018-12-19 RX ADMIN — ONDANSETRON 4 MILLIGRAM(S): 8 TABLET, FILM COATED ORAL at 14:25

## 2018-12-19 RX ADMIN — HYDROMORPHONE HYDROCHLORIDE 0.25 MILLIGRAM(S): 2 INJECTION INTRAMUSCULAR; INTRAVENOUS; SUBCUTANEOUS at 08:55

## 2018-12-19 RX ADMIN — OXYCODONE HYDROCHLORIDE 5 MILLIGRAM(S): 5 TABLET ORAL at 12:00

## 2018-12-19 RX ADMIN — Medication 400 MILLIGRAM(S): at 19:04

## 2018-12-19 RX ADMIN — Medication 200 MILLIGRAM(S): at 08:00

## 2018-12-19 RX ADMIN — OXYCODONE HYDROCHLORIDE 5 MILLIGRAM(S): 5 TABLET ORAL at 16:05

## 2018-12-19 RX ADMIN — Medication 100 MILLIGRAM(S): at 22:46

## 2018-12-19 RX ADMIN — OXYCODONE HYDROCHLORIDE 5 MILLIGRAM(S): 5 TABLET ORAL at 16:35

## 2018-12-19 RX ADMIN — HYDROMORPHONE HYDROCHLORIDE 0.25 MILLIGRAM(S): 2 INJECTION INTRAMUSCULAR; INTRAVENOUS; SUBCUTANEOUS at 12:00

## 2018-12-19 RX ADMIN — ENOXAPARIN SODIUM 40 MILLIGRAM(S): 100 INJECTION SUBCUTANEOUS at 13:21

## 2018-12-19 RX ADMIN — Medication 100 MILLIGRAM(S): at 05:00

## 2018-12-19 RX ADMIN — Medication 100 MILLIGRAM(S): at 13:21

## 2018-12-19 RX ADMIN — Medication 62.5 MILLIMOLE(S): at 04:00

## 2018-12-19 RX ADMIN — Medication 1000 MILLIGRAM(S): at 19:34

## 2018-12-19 RX ADMIN — OXYCODONE HYDROCHLORIDE 5 MILLIGRAM(S): 5 TABLET ORAL at 07:15

## 2018-12-19 RX ADMIN — Medication 975 MILLIGRAM(S): at 06:15

## 2018-12-19 RX ADMIN — Medication 200 MILLIGRAM(S): at 20:47

## 2018-12-19 NOTE — PROGRESS NOTE ADULT - ATTENDING COMMENTS
seen and examined 12-19-18 @ 1300    tolerating regular diet  no nausea or vomiting    soft / NT / ND  obese    WBC = 18  HbA1c = 11.2    s/p laparoscopic appendectomy for perforated appendicitis on 12/18/2018  -discharge home on ABx when leukcytsosis improves    uncontrolled DM  -she is adjusting her DM regimen with her outpatient endocrinologist and agrees to f/u after discharge seen and examined 12-19-18 @ 1650    tolerating regular diet  no nausea or vomiting    afeb  AVSS  soft / NT / mildly distended / RUQ tympanitic    WBC = 18      s/p laparoscopic appendectomy for perforated appendicitis on 12/18/2018  -discharge home on ABx when leukocytosis improves

## 2018-12-19 NOTE — DISCHARGE NOTE ADULT - CARE PLAN
Principal Discharge DX:	Appendicitis  Goal:	Recover from surgery  Assessment and plan of treatment:	Please follow up with Dr. Ortiz in 10-14 days

## 2018-12-19 NOTE — PROGRESS NOTE ADULT - ASSESSMENT
24 y/o female s/p lap appendectomy POD#1.   Doing well.   - Pain control with tylenol/oxy prn  - OOB/ Ambulate  - continue IV abx x 24 hrs  - ADAT  - D/C planning home sofia.     SHARLENE Lewis-C  (P) [phone]

## 2018-12-19 NOTE — DISCHARGE NOTE ADULT - CARE PROVIDER_API CALL
Quin Ortiz), Surgery; Surgical Critical Care  [address]  [address] [address]  Phone: [phone]  Fax: [phone]

## 2018-12-19 NOTE — PROGRESS NOTE ADULT - SUBJECTIVE AND OBJECTIVE BOX
POST OPERATIVE DAY #: 1    SUBJECTIVE: Pt seen and examined this morning. Pt was c/o pain. She had only received tylenol.   Pt states she only had a few sips of water post op, but denies n/v and GI function.   Pt denies SOB, CP, palpitations        Vital Signs Last 24 Hrs  T(C): 36.6 (19 Dec 2018 08:00), Max: 37 (19 Dec 2018 04:00)  T(F): 97.9 (19 Dec 2018 08:00), Max: 98.6 (19 Dec 2018 04:00)  HR: 66 (19 Dec 2018 12:00) (66 - 102)  BP: 115/60 (19 Dec 2018 12:00) (102/51 - 133/75)  BP(mean): 82 (19 Dec 2018 08:00) (70 - 93)  RR: 14 (19 Dec 2018 12:00) (14 - 19)  SpO2: 100% (19 Dec 2018 12:00) (94% - 100%)  I&O's Summary    18 Dec 2018 07:01  -  19 Dec 2018 07:00  --------------------------------------------------------  IN: 1337.5 mL / OUT: 950 mL / NET: 387.5 mL    19 Dec 2018 07:01  -  19 Dec 2018 12:49  --------------------------------------------------------  IN: 262.5 mL / OUT: 600 mL / NET: -337.5 mL      I&O's Detail    18 Dec 2018 07:01  -  19 Dec 2018 07:00  --------------------------------------------------------  IN:    dextrose 5% + sodium chloride 0.45%.: 750 mL    IV PiggyBack: 400 mL    Solution: 187.5 mL  Total IN: 1337.5 mL    OUT:    Voided: 950 mL  Total OUT: 950 mL    Total NET: 387.5 mL      19 Dec 2018 07:01  -  19 Dec 2018 12:49  --------------------------------------------------------  IN:    IV PiggyBack: 200 mL    Solution: 62.5 mL  Total IN: 262.5 mL    OUT:    Voided: 600 mL  Total OUT: 600 mL    Total NET: -337.5 mL          Labs:                         12.2   17.7  )-----------( 257      ( 19 Dec 2018 02:46 )             36.9     12-19    135  |  99  |  6<L>  ----------------------------<  153<H>  4.0   |  23  |  0.45<L>    Ca    8.4      19 Dec 2018 02:46  Phos  2.2     12-19  Mg     2.1     12-19    TPro  8.4<H>  /  Alb  4.9  /  TBili  0.5  /  DBili  x   /  AST  13  /  ALT  16  /  AlkPhos  55  12-18    PT/INR - ( 18 Dec 2018 07:14 )   PT: 13.0 sec;   INR: 1.14 ratio         PTT - ( 18 Dec 2018 07:14 )  PTT:29.6 sec      Physical Exam:  General Appearance: NAD, A&O x3  Abdomen: Softly distended, appropriately tender, no rebound/guarding, no active bleeding/hematoma                      dressings clean, dry, and intact

## 2018-12-19 NOTE — DISCHARGE NOTE ADULT - PATIENT PORTAL LINK FT
You can access the [facility] Patient Portal, offered by [facility], by registering with the following website: [email or web address]

## 2018-12-19 NOTE — DISCHARGE NOTE ADULT - ADDITIONAL INSTRUCTIONS
Please follow up with Dr. Ortiz in 10-14 days Please follow up with Dr. Ortiz in 10-14 days.  Please follow up with your endocrinologist within 2 weeks of discharge to discuss DM medications.

## 2018-12-19 NOTE — DISCHARGE NOTE ADULT - MEDICATION SUMMARY - MEDICATIONS TO TAKE
I will START or STAY ON the medications listed below when I get home from the hospital:    acetaminophen 325 mg oral tablet  -- 3 tab(s) by mouth every 6 hours, As needed, Mild Pain (1 - 3), Moderate Pain (4 - 6)  -- Indication: For pain    oxyCODONE 5 mg oral tablet  -- 1 tab(s) by mouth every 6 hours, As Needed -Moderate Pain (4 - 6) MDD:4 tabs  -- Indication: For pain I will START or STAY ON the medications listed below when I get home from the hospital:    Flagyl 500 mg oral tablet  -- 1 tab(s) by mouth every 8 hours   -- Do not drink alcoholic beverages when taking this medication.  Finish all this medication unless otherwise directed by prescriber.  May discolor urine or feces.    -- Indication: For Antibiotic    oxyCODONE 5 mg oral tablet  -- 1 tab(s) by mouth every 6 hours, As Needed -Moderate Pain (4 - 6) MDD:4 tabs  -- Indication: For pain    acetaminophen 325 mg oral tablet  -- 3 tab(s) by mouth every 6 hours, As needed, Mild Pain (1 - 3), Moderate Pain (4 - 6)  -- Indication: For pain    oxyCODONE 5 mg oral tablet  -- 1 tab(s) by mouth every 6 hours, As Needed -Moderate Pain (4 - 6) MDD:4 tabs  -- Indication: For pain    Cipro 500 mg oral tablet  -- 1 tab(s) by mouth 2 times a day   -- Avoid prolonged or excessive exposure to direct and/or artificial sunlight while taking this medication.  Check with your doctor before becoming pregnant.  Do not take dairy products, antacids, or iron preparations within one hour of this medication.  Finish all this medication unless otherwise directed by prescriber.  Medication should be taken with plenty of water.    -- Indication: For Antibiotic

## 2018-12-19 NOTE — ANESTHESIA FOLLOW-UP NOTE - NSEVALATION_GEN_ALL_CORE
No apparent complications or complaints regarding anesthesia care at this time/All questions were answered no

## 2018-12-19 NOTE — DISCHARGE NOTE ADULT - INSTRUCTIONS
You may shower. Pat Dry abdomen. Leave the white steri strips in place, they will fall off on their own in approximately 5-7 days.   BATHING: Please do not submerge wound underwater. You may shower and/or sponge bathe.  ACTIVITY: No heavy lifting anything more than 10-15lbs or straining. Otherwise, you may return to your usual level of physical activity. If you are taking narcotic pain medication (such as Percocet), do NOT drive a car, operate machinery or make important decisions.  DIET: Low fiber diet  NOTIFY YOUR SURGEON IF: You have any bleeding that does not stop, any pus draining from your wound, any fever (over 100.4 F) or chills, persistent nausea/vomiting with inability to tolerate food or liquids, persistent diarrhea, or if your pain is not controlled on your discharge pain medications.  FOLLOW-UP:  1. Please call to make a follow-up appointment within one week of discharge   2. Please follow up with your primary care physician in one week regarding your hospitalization. You may shower. Pat Dry abdomen. Leave the white steri strips in place, they will fall off on their own in approximately 5-7 days.   BATHING: Please do not submerge wound underwater. You may shower and/or sponge bathe.  ACTIVITY: No heavy lifting anything more than 10-15lbs or straining. Otherwise, you may return to your usual level of physical activity. If you are taking narcotic pain medication (such as Percocet), do NOT drive a car, operate machinery or make important decisions.  DIET: Regular diet.  NOTIFY YOUR SURGEON IF: You have any bleeding that does not stop, any pus draining from your wound, any fever (over 100.4 F) or chills, persistent nausea/vomiting with inability to tolerate food or liquids, persistent diarrhea, or if your pain is not controlled on your discharge pain medications.  FOLLOW-UP:  1. Please call to make a follow-up appointment within one to two weeks of discharge   2. Please follow up with your primary care physician in one week regarding your hospitalization.  3. Please follow up with your endocrinologist in one to two weeks following discharge to adjust DM medications. You may shower. Pat Dry abdomen. Leave the white steri strips in place, they will fall off on their own in approximately 5-7 days.   BATHING: Please do not submerge wound underwater. You may shower and/or sponge bathe.  ACTIVITY: No heavy lifting anything more than 10-15lbs or straining. Otherwise, you may return to your usual level of physical activity. If you are taking narcotic pain medication (such as Percocet), do NOT drive a car, operate machinery or make important decisions.  DIET: Regular diet.  NOTIFY YOUR SURGEON IF: You have any bleeding that does not stop, any pus draining from your wound, any fever (over 100.4 F) or chills, persistent nausea/vomiting with inability to tolerate food or liquids, persistent diarrhea, or if your pain is not controlled on your discharge pain medications.  FOLLOW-UP:  1. Please call to make a follow-up appointment within one to two weeks of discharge   2. Please follow up with your primary care physician in one week regarding your hospitalization.

## 2018-12-19 NOTE — DISCHARGE NOTE ADULT - NS AS ACTIVITY OBS
Showering allowed/Walking-Indoors allowed/Walking-Outdoors allowed/DO NOT DRIVE WHILE TAKING NARCOTIC PAIN MEDICATION/No Heavy lifting/straining/Bathing allowed

## 2018-12-19 NOTE — DISCHARGE NOTE ADULT - HOSPITAL COURSE
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 Blanco (patient's cardiologist) presented with abdominal pain. Patient states that her pain started 2 weeks ago but localized to the left abdomen, intermittent. Patient presented to urgent care when pain first started and was diagnosed with UTI and was given Bactrim. Patient states that her pain hasn't completely resolved but suddenly got much worse yesterday. She states that "the spot of the pain changed". She localizes the worsening pain more to the lower abdomen. Patient also endorses fever, nausea, vomiting with her worsening pain also started yesterday. Patient's last PO was 1 day ago. Patient's last menstrual cycle ended about 2 weeks ago.    EXAM:  CT ABDOMEN AND PELVIS OC IC Pelvis: The endometrium is thickened measuring up to 8 mm which likely correlates with the phase of the patient's menstrual cycle. A 1.9 x 1.3 cm left ovarian cyst versus dominant follicle is noted. Right ovary and urinary bladder are unremarkable. Trace free pelvic fluid is noted.  Gastrointestinal tract: The appendix is dilated measuring up to 1.0 cm with moderate periappendiceal inflammatory change compatible with an acute appendicitis. No adjacent focal fluid collection is seen to suggest an abscess. There is no evidence of a bowel obstruction.  The patient was admitted to the Trauma surgery service, made NPO/IVF, with IV abx. The patient was taken to the OR and underwent a laparoscopic appendectomy.  The patient tolerated the procedure well without complications, was extubated, and transferred to the PACU in stable condition.  In the PACU, the patients' pain was controlled, vitals stable, tolerating PO, and voiding appropriately.  The patient was transferred to the surgical floor in stable condition. On day of discharge, the patient was tolerating diet, ambulating well and pain controlled. The patient will be discharged home with outpatient follow up with Dr. Ortiz in 1-2 weeks. 12/18: Patient is 24 yo female with WPW s/p ablation by Dr. Blanco (patient's cardiologist) presented with abdominal pain. Patient states that her pain started 2 weeks ago but localized to the left abdomen, intermittent. Patient presented to urgent care when pain first started and was diagnosed with UTI and was given Bactrim. Patient states that her pain hasn't completely resolved but suddenly got much worse yesterday. She states that "the spot of the pain changed". She localizes the worsening pain more to the lower abdomen. Patient also endorses fever, nausea, vomiting with her worsening pain also started yesterday. Patient's last PO was 1 day ago. Patient's last menstrual cycle ended about 2 weeks ago.    IMAGING: CT ABDOMEN AND PELVIS OC IC Pelvis: The endometrium is thickened measuring up to 8 mm which likely correlates with the phase of the patient's menstrual cycle. A 1.9 x 1.3 cm left ovarian cyst versus dominant follicle is noted. Right ovary and urinary bladder are unremarkable. Trace free pelvic fluid is noted.  Gastrointestinal tract: The appendix is dilated measuring up to 1.0 cm with moderate periappendiceal inflammatory change compatible with an acute appendicitis. No adjacent focal fluid collection is seen to suggest an abscess. There is no evidence of a bowel obstruction.  The patient was admitted to the Trauma surgery service, made NPO/IVF, with IV abx. The patient had pre-op labs sent, was consented, and taken to the OR and underwent a laparoscopic appendectomy.  The patient tolerated the procedure well without complications, was extubated, and transferred to the PACU in stable condition.  In the PACU, the patients' pain was controlled, vitals stable, tolerating PO, and voiding appropriately.  Her diet was advanced, which she tolerated.  On day of discharge, the patient was tolerating diet, ambulating well and pain controlled. The patient will be discharged home with outpatient follow up with Dr. Ortiz in 1-2 weeks.

## 2018-12-20 VITALS
SYSTOLIC BLOOD PRESSURE: 115 MMHG | RESPIRATION RATE: 18 BRPM | OXYGEN SATURATION: 97 % | DIASTOLIC BLOOD PRESSURE: 77 MMHG | HEART RATE: 77 BPM | TEMPERATURE: 98 F

## 2018-12-20 LAB
ANION GAP SERPL CALC-SCNC: 10 MMOL/L — SIGNIFICANT CHANGE UP (ref 5–17)
BUN SERPL-MCNC: 7 MG/DL — SIGNIFICANT CHANGE UP (ref 7–23)
CALCIUM SERPL-MCNC: 9.2 MG/DL — SIGNIFICANT CHANGE UP (ref 8.4–10.5)
CHLORIDE SERPL-SCNC: 100 MMOL/L — SIGNIFICANT CHANGE UP (ref 96–108)
CO2 SERPL-SCNC: 28 MMOL/L — SIGNIFICANT CHANGE UP (ref 22–31)
CREAT SERPL-MCNC: 0.54 MG/DL — SIGNIFICANT CHANGE UP (ref 0.5–1.3)
GLUCOSE SERPL-MCNC: 97 MG/DL — SIGNIFICANT CHANGE UP (ref 70–99)
HCT VFR BLD CALC: 36.8 % — SIGNIFICANT CHANGE UP (ref 34.5–45)
HGB BLD-MCNC: 12.7 G/DL — SIGNIFICANT CHANGE UP (ref 11.5–15.5)
MAGNESIUM SERPL-MCNC: 2.2 MG/DL — SIGNIFICANT CHANGE UP (ref 1.6–2.6)
MCHC RBC-ENTMCNC: 30.1 PG — SIGNIFICANT CHANGE UP (ref 27–34)
MCHC RBC-ENTMCNC: 34.6 GM/DL — SIGNIFICANT CHANGE UP (ref 32–36)
MCV RBC AUTO: 87.1 FL — SIGNIFICANT CHANGE UP (ref 80–100)
PHOSPHATE SERPL-MCNC: 2.3 MG/DL — LOW (ref 2.5–4.5)
PLATELET # BLD AUTO: 277 K/UL — SIGNIFICANT CHANGE UP (ref 150–400)
POTASSIUM SERPL-MCNC: 3.8 MMOL/L — SIGNIFICANT CHANGE UP (ref 3.5–5.3)
POTASSIUM SERPL-SCNC: 3.8 MMOL/L — SIGNIFICANT CHANGE UP (ref 3.5–5.3)
RBC # BLD: 4.22 M/UL — SIGNIFICANT CHANGE UP (ref 3.8–5.2)
RBC # FLD: 12.4 % — SIGNIFICANT CHANGE UP (ref 10.3–14.5)
SODIUM SERPL-SCNC: 138 MMOL/L — SIGNIFICANT CHANGE UP (ref 135–145)
WBC # BLD: 9 K/UL — SIGNIFICANT CHANGE UP (ref 3.8–10.5)
WBC # FLD AUTO: 9 K/UL — SIGNIFICANT CHANGE UP (ref 3.8–10.5)

## 2018-12-20 PROCEDURE — 74177 CT ABD & PELVIS W/CONTRAST: CPT

## 2018-12-20 PROCEDURE — 82803 BLOOD GASES ANY COMBINATION: CPT

## 2018-12-20 PROCEDURE — 80053 COMPREHEN METABOLIC PANEL: CPT

## 2018-12-20 PROCEDURE — 83605 ASSAY OF LACTIC ACID: CPT

## 2018-12-20 PROCEDURE — 96361 HYDRATE IV INFUSION ADD-ON: CPT | Mod: XU

## 2018-12-20 PROCEDURE — 84295 ASSAY OF SERUM SODIUM: CPT

## 2018-12-20 PROCEDURE — 85730 THROMBOPLASTIN TIME PARTIAL: CPT

## 2018-12-20 PROCEDURE — 84100 ASSAY OF PHOSPHORUS: CPT

## 2018-12-20 PROCEDURE — 96375 TX/PRO/DX INJ NEW DRUG ADDON: CPT

## 2018-12-20 PROCEDURE — 82962 GLUCOSE BLOOD TEST: CPT

## 2018-12-20 PROCEDURE — 86900 BLOOD TYPING SEROLOGIC ABO: CPT

## 2018-12-20 PROCEDURE — 86850 RBC ANTIBODY SCREEN: CPT

## 2018-12-20 PROCEDURE — 85610 PROTHROMBIN TIME: CPT

## 2018-12-20 PROCEDURE — 85027 COMPLETE CBC AUTOMATED: CPT

## 2018-12-20 PROCEDURE — 85014 HEMATOCRIT: CPT

## 2018-12-20 PROCEDURE — 80048 BASIC METABOLIC PNL TOTAL CA: CPT

## 2018-12-20 PROCEDURE — 99024 POSTOP FOLLOW-UP VISIT: CPT

## 2018-12-20 PROCEDURE — 96374 THER/PROPH/DIAG INJ IV PUSH: CPT | Mod: XU

## 2018-12-20 PROCEDURE — 99285 EMERGENCY DEPT VISIT HI MDM: CPT | Mod: 25

## 2018-12-20 PROCEDURE — 82330 ASSAY OF CALCIUM: CPT

## 2018-12-20 PROCEDURE — 82435 ASSAY OF BLOOD CHLORIDE: CPT

## 2018-12-20 PROCEDURE — 86901 BLOOD TYPING SEROLOGIC RH(D): CPT

## 2018-12-20 PROCEDURE — 84132 ASSAY OF SERUM POTASSIUM: CPT

## 2018-12-20 PROCEDURE — 83735 ASSAY OF MAGNESIUM: CPT

## 2018-12-20 PROCEDURE — 82947 ASSAY GLUCOSE BLOOD QUANT: CPT

## 2018-12-20 RX ORDER — DOCUSATE SODIUM 100 MG
100 CAPSULE ORAL ONCE
Qty: 0 | Refills: 0 | Status: COMPLETED | OUTPATIENT
Start: 2018-12-20 | End: 2018-12-20

## 2018-12-20 RX ORDER — MOXIFLOXACIN HYDROCHLORIDE TABLETS, 400 MG 400 MG/1
1 TABLET, FILM COATED ORAL
Qty: 10 | Refills: 0 | OUTPATIENT
Start: 2018-12-20 | End: 2018-12-24

## 2018-12-20 RX ORDER — METRONIDAZOLE 500 MG
1 TABLET ORAL
Qty: 15 | Refills: 0 | OUTPATIENT
Start: 2018-12-20 | End: 2018-12-24

## 2018-12-20 RX ORDER — SODIUM,POTASSIUM PHOSPHATES 278-250MG
2 POWDER IN PACKET (EA) ORAL ONCE
Qty: 0 | Refills: 0 | Status: COMPLETED | OUTPATIENT
Start: 2018-12-20 | End: 2018-12-20

## 2018-12-20 RX ORDER — OXYCODONE HYDROCHLORIDE 5 MG/1
1 TABLET ORAL
Qty: 12 | Refills: 0 | OUTPATIENT
Start: 2018-12-20 | End: 2018-12-22

## 2018-12-20 RX ADMIN — Medication 975 MILLIGRAM(S): at 06:00

## 2018-12-20 RX ADMIN — Medication 2 TABLET(S): at 12:29

## 2018-12-20 RX ADMIN — Medication 200 MILLIGRAM(S): at 08:32

## 2018-12-20 RX ADMIN — Medication 975 MILLIGRAM(S): at 05:11

## 2018-12-20 RX ADMIN — Medication 100 MILLIGRAM(S): at 05:11

## 2018-12-20 RX ADMIN — Medication 100 MILLIGRAM(S): at 12:27

## 2018-12-20 NOTE — PROGRESS NOTE ADULT - ASSESSMENT
24 y/o female s/p lap appendectomy POD#. Doing well on the floors and tolerating diet.    - Pain control as needed  - OOB/ Ambulate  - c/w regular diet  - d/c today    Trauma Surgery  [phone]

## 2018-12-20 NOTE — PROGRESS NOTE ADULT - ATTENDING COMMENTS
seen and examined 12-20-18 @ 0945    tolerating regular diet  no nausea or vomiting    soft / NT / mildly distended / RUQ tympanitic    WBC = 18 -> 9  HbA1c = 11.2    s/p laparoscopic appendectomy for perforated appendicitis on 12/18/2018  -discharge home on ABx  -can take OTC meds for constipation which was present prior to acute appendicitis seen and examined 12-20-18 @ 0945    tolerating regular diet  no nausea or vomiting    soft / NT / mildly distended / RUQ tympanitic    WBC = 18 -> 9      s/p laparoscopic appendectomy for perforated appendicitis on 12/18/2018  -discharge home on ABx  -can take OTC meds for constipation which was present prior to acute appendicitis

## 2018-12-20 NOTE — PROGRESS NOTE ADULT - SUBJECTIVE AND OBJECTIVE BOX
Surgery Progress Note    S: Patient seen and examined. No acute events overnight. patient reports feeling well with pain well controlled. patient is tolerating regular diet without n/v.     O:  Vital Signs Last 24 Hrs  T(C): 36.9 (20 Dec 2018 05:48), Max: 36.9 (20 Dec 2018 05:48)  T(F): 98.4 (20 Dec 2018 05:48), Max: 98.4 (20 Dec 2018 05:48)  HR: 69 (20 Dec 2018 05:48) (66 - 82)  BP: 118/74 (20 Dec 2018 05:48) (107/64 - 130/78)  BP(mean): 81 (19 Dec 2018 13:19) (81 - 81)  RR: 18 (20 Dec 2018 05:48) (14 - 18)  SpO2: 98% (20 Dec 2018 05:48) (96% - 100%)    I&O's Detail    19 Dec 2018 07:01  -  20 Dec 2018 07:00  --------------------------------------------------------  IN:    dextrose 5% + sodium chloride 0.45%.: 75 mL    IV PiggyBack: 850 mL    Oral Fluid: 740 mL    Solution: 62.5 mL  Total IN: 1727.5 mL    OUT:    Voided: 2050 mL  Total OUT: 2050 mL    Total NET: -322.5 mL          MEDICATIONS  (STANDING):  ciprofloxacin   IVPB 400 milliGRAM(s) IV Intermittent every 12 hours  enoxaparin Injectable 40 milliGRAM(s) SubCutaneous daily  metroNIDAZOLE  IVPB 500 milliGRAM(s) IV Intermittent every 8 hours  potassium acid phosphate/sodium acid phosphate tablet (K-PHOS No. 2) 1 Tablet(s) Oral once  potassium acid phosphate/sodium acid phosphate tablet (K-PHOS No. 2) 2 Tablet(s) Oral once    MEDICATIONS  (PRN):  acetaminophen   Tablet .. 975 milliGRAM(s) Oral every 6 hours PRN Mild Pain (1 - 3), Moderate Pain (4 - 6)  oxyCODONE    IR 5 milliGRAM(s) Oral every 4 hours PRN Moderate Pain (4 - 6)  oxyCODONE    IR 10 milliGRAM(s) Oral every 4 hours PRN Severe Pain (7 - 10)                            12.7   9.0   )-----------( 277      ( 20 Dec 2018 06:51 )             36.8       12-20    138  |  100  |  7   ----------------------------<  97  3.8   |  28  |  0.54    Ca    9.2      20 Dec 2018 06:51  Phos  2.3     12-20  Mg     2.2     12-20        Physical Exam:  Gen: Laying in bed, NAD  Resp: Unlabored breathing  Abd: soft, NTND, incisions c/d/i, no rebound or guarding  Ext: WWP  Skin: No rashes

## 2018-12-24 LAB — SURGICAL PATHOLOGY STUDY: SIGNIFICANT CHANGE UP

## 2019-01-09 ENCOUNTER — APPOINTMENT (OUTPATIENT)
Dept: TRAUMA SURGERY | Facility: CLINIC | Age: 24
End: 2019-01-09
Payer: COMMERCIAL

## 2019-01-09 VITALS
WEIGHT: 155 LBS | HEIGHT: 65.5 IN | HEART RATE: 70 BPM | DIASTOLIC BLOOD PRESSURE: 72 MMHG | BODY MASS INDEX: 25.52 KG/M2 | TEMPERATURE: 98.5 F | SYSTOLIC BLOOD PRESSURE: 123 MMHG

## 2019-01-09 PROCEDURE — 99024 POSTOP FOLLOW-UP VISIT: CPT

## 2019-01-09 NOTE — HISTORY OF PRESENT ILLNESS
[de-identified] :  returns to the office for postop follow up after lap appy for acute appendicitis. She is feeling well, no abdominal pain, no N/V. She is not taking any pain medication. She is tolerating normal diet. Normal bowel function. She is having occasional mild dizziness, which does not seem to be related to eating or drinking. She's not taking any new medications, OTC, etc. No major dietary changes. No symptoms of URI or ear infection. \par \par Very pleasant. NAD\par Abd soft, nontender, nondistended. Lap appy incisions closed, no erythema, drainage, induration, or fluctuance.\par \par Path shows appendiceal diverticulitis with periappendicitis.\par \par I advised  to see her PMD regarding her dizziness and not to drive if she is feeling dizzy. Also, that if her dizzy spells come on suddenly that she should avoid driving altogether for now.  noted that she is seeking a PMD and is awaiting insurance list of accepted providers. I advised her that if this problem worsens, to go to the ED immediately. If she has difficulty finding a PMD, I advised her to call our office and we will try to help.\par \par

## 2019-05-15 ENCOUNTER — NON-APPOINTMENT (OUTPATIENT)
Age: 24
End: 2019-05-15

## 2019-05-15 ENCOUNTER — APPOINTMENT (OUTPATIENT)
Dept: ELECTROPHYSIOLOGY | Facility: CLINIC | Age: 24
End: 2019-05-15
Payer: COMMERCIAL

## 2019-05-15 VITALS
HEART RATE: 64 BPM | OXYGEN SATURATION: 100 % | HEIGHT: 66 IN | BODY MASS INDEX: 24.91 KG/M2 | WEIGHT: 155 LBS | DIASTOLIC BLOOD PRESSURE: 80 MMHG | SYSTOLIC BLOOD PRESSURE: 121 MMHG

## 2019-05-15 PROCEDURE — 93000 ELECTROCARDIOGRAM COMPLETE: CPT

## 2019-05-15 PROCEDURE — 99214 OFFICE O/P EST MOD 30 MIN: CPT | Mod: 25

## 2019-05-15 NOTE — PHYSICAL EXAM
[General Appearance - Well Developed] : well developed [Well Groomed] : well groomed [Normal Appearance] : normal appearance [General Appearance - Well Nourished] : well nourished [Normal Conjunctiva] : the conjunctiva exhibited no abnormalities [General Appearance - In No Acute Distress] : no acute distress [No Deformities] : no deformities [Normal Oral Mucosa] : normal oral mucosa [Eyelids - No Xanthelasma] : the eyelids demonstrated no xanthelasmas [No Oral Pallor] : no oral pallor [No Oral Cyanosis] : no oral cyanosis [No Jugular Venous Moise A Waves] : no jugular venous moise A waves [Normal Jugular Venous A Waves Present] : normal jugular venous A waves present [Normal Jugular Venous V Waves Present] : normal jugular venous V waves present [Respiration, Rhythm And Depth] : normal respiratory rhythm and effort [Auscultation Breath Sounds / Voice Sounds] : lungs were clear to auscultation bilaterally [Exaggerated Use Of Accessory Muscles For Inspiration] : no accessory muscle use [Heart Rate And Rhythm] : heart rate and rhythm were normal [Murmurs] : no murmurs present [Heart Sounds] : normal S1 and S2 [Abdomen Soft] : soft [Abdomen Tenderness] : non-tender [Abdomen Mass (___ Cm)] : no abdominal mass palpated [Abnormal Walk] : normal gait [Gait - Sufficient For Exercise Testing] : the gait was sufficient for exercise testing [Nail Clubbing] : no clubbing of the fingernails [Petechial Hemorrhages (___cm)] : no petechial hemorrhages [Cyanosis, Localized] : no localized cyanosis [] : no rash [Skin Color & Pigmentation] : normal skin color and pigmentation [Skin Lesions] : no skin lesions [No Venous Stasis] : no venous stasis [No Xanthoma] : no  xanthoma was observed [No Skin Ulcers] : no skin ulcer [Mood] : the mood was normal [Affect] : the affect was normal [Oriented To Time, Place, And Person] : oriented to person, place, and time [No Anxiety] : not feeling anxious

## 2019-05-15 NOTE — DISCUSSION/SUMMARY
[FreeTextEntry1] : Ms. Miller is a 23 year old female with WPW and syncope s/p AP ablation. She is now having recurrent palpitations without further evidence of manifest pre-excitation. She will be given a two week event monitor today and knows to trigger the device during symptom episodes. I will call her with the results.\par \par Ms. Miller appeared to understand the whole discussion and verbalized that all of her questions were answered to her satisfaction.\par \par Thank you for allowing me to be involved in the care of this pleasant woman. Please feel free to contact me with any questions.

## 2019-05-15 NOTE — HISTORY OF PRESENT ILLNESS
[FreeTextEntry1] : Contact number: [phone]\par \par Ms. Miller is a 23 year old female who is status post ablation of WPW (posteroseptal pathway) with Dr. Blacno in 2017. She was doing well following ablation but several months ago she began noting recurrent onset of palpitations. These occur particularly when she's working out but also have been awakening her from sleep. She has no dizziness or syncope but occasionally feels shortness of breath.

## 2019-06-24 ENCOUNTER — TRANSCRIPTION ENCOUNTER (OUTPATIENT)
Age: 24
End: 2019-06-24

## 2019-06-27 ENCOUNTER — RESULT REVIEW (OUTPATIENT)
Age: 24
End: 2019-06-27

## 2019-07-01 ENCOUNTER — APPOINTMENT (OUTPATIENT)
Dept: OBGYN | Facility: CLINIC | Age: 24
End: 2019-07-01

## 2019-09-24 NOTE — PATIENT PROFILE ADULT - NSPROCHRONICPAIN_GEN_A_NUR
Detail Level: Detailed Quality 130: Documentation Of Current Medications In The Medical Record: Current Medications Documented no

## 2020-01-31 NOTE — DISCHARGE NOTE ADULT - COMPLETE THE FOLLOWING IF THE PATIENT REFUSES THE INFLUENZA VACCINE:
"Outpatient Speech Language Pathology   Peds Speech Language Treatment Note       Patient Name: Sarmad Lopes  : 2016  MRN: [id number]  Today's Date: 2020      Visit Date: 2020    There is no problem list on file for this patient.      Visit Dx:    ICD-10-CM ICD-9-CM   1. Speech/language delay F80.9 315.39                       OP SLP Assessment/Plan - 20 0900        SLP Assessment    Functional Problems  Speech Language- Peds   -KG (r) KB (t) KG (c)    Clinical Impression Comments  Sarmad is making good progress. He was able to categorize foods and non-food items. He was also able to answer \"wh\" questions.   -KG (r) KB (t) KG (c)       SLP Plan    Plan Comments  Continue therapy.   -KG (r) KB (t) KG (c)      User Key  (r) = Recorded By, (t) = Taken By, (c) = Cosigned By    Initials Name Provider Type    Debbie Heredia, CCC-SLP Speech and Language Pathologist    Jazmin Le, Speech Therapy Student Speech Therapy Student          SLP OP Goals     Row Name 20 0900          Goal Type Needed    Goal Type Needed  Pediatric Goals  -KG (r) KB (t) KG (c)        Subjective Comments    Subjective Comments  Sarmad was alert and ready to participate in therapy.  -KG (r) KB (t) KG (c)        Short-Term Goals    STG- 1  Child will use/understand plurals with 80% accuracy.   -KG (r) KB (t) KG (c)     Status: STG- 1  Achieved  -KG (r) KB (t) KG (c)     Comments: STG- 1  Sarmad used plurals spontaneously in context during theraputic play tasks. Demonstrated understanding. Achieved 19  -KG (r) KB (t) KG (c)     STG- 2  Child will name a described object in 8/10 opportunities.   -KG (r) KB (t) KG (c)     Status: STG- 2  Achieved  -KG (r) KB (t) KG (c)     Comments: STG- 2  Achieved 11/15/19  -KG (r) KB (t) KG (c)     STG- 3  Child will use/understand possessive pronouns his and hers with 80% accuracy.  -KG (r) KB (t) KG (c)     Status: STG- 3  Progressing as expected  -KG (r) KB (t) KG " "(c)     Comments: STG- 3  Data not taken today. Previous: Sarmad demonstrated understanding of he/she, his/her(s) by identifying. Improved use of \"her\". SLP modeled she & hers.   -KG (r) KB (t) KG (c)     STG- 4  Child will understand categories by placing items in correct categories with 80% accuracy.  -KG (r) KB (t) KG (c)     Status: STG- 4  Progressing as expected  -KG (r) KB (t) KG (c)     Comments: STG- 4  Sarmad placed items into categories based on if they were foods. He was able to identify all foods correctly and had no difficulty completing the task.  -KG (r) KB (t) KG (c)     STG- 5  Child will complete diagnostic testing to determine progress and new areas of concern.  -KG (r) KB (t) KG (c)     Status: STG- 5  Achieved  -KG (r) KB (t) KG (c)     Comments: STG- 5  PLS-5 completed previously. Scores WNL. Child does not always use expressive forms in spontaneoud conversation.  Expressive language: raw score of 39, standard score of 92, 30th percentile, age equivalent of 3 years 4 months. Auditory comprehension: Raw score: 43. Standard score: 99. Percentile rank: 47. Age equivalent: 3 years 9 months. Total langauge scores: raw score 82 with age eqivalent of 3 years 7 months.   -KG (r) KB (t) KG (c)     STG- 6  --  -KG (r) KB (t) KG (c)     Status: STG- 6  --  -KG (r) KB (t) KG (c)     Comments: STG- 6  --  -KG (r) KB (t) KG (c)     STG- 7  Child will answer simple age appropriate questions (what's...doing?) at 80% accuracy over 3 consecutive sessions.   -KG (r) KB (t) KG (c)     Status: STG- 7  Progressing as expected  -KG (r) KB (t) KG (c)     Comments: STG- 7  During the activity, Sarmad was able to answer \"wh\" questions (eg. What is the name of this food? What color are strawberries?)   -KG (r) KB (t) KG (c)     STG- 8  --  -KG (r) KB (t) KG (c)     Status: STG- 8  --  -KG (r) KB (t) KG (c)     Comments: STG- 8  --  -KG (r) KB (t) KG (c)     STG- 9  --  -KG (r) KB (t) KG (c)     Status: STG- 9  --  -KG " (r) KB (t) KG (c)     Comments: STG- 9  --  -KG (r) KB (t) KG (c)     STG- 10  --  -KG (r) KB (t) KG (c)     Status: STG- 10  --  -KG (r) KB (t) KG (c)     Comments: STG- 10  --  -KG (r) KB (t) KG (c)        Long-Term Goals    LTG- 1  The parent will agree to participate in home stimulation program as outlined by SLP.   -KG (r) KB (t) KG (c)     Status: LTG- 1  Progressing as expected  -KG (r) KB (t) KG (c)     Comments: LTG- 1  SLP reviewed goals and progress with mom after therapy.  -KG (r) KB (t) KG (c)        SLP Time Calculation    SLP Goal Re-Cert Due Date  03/20/20  -KG (r) KB (t) KG (c)       User Key  (r) = Recorded By, (t) = Taken By, (c) = Cosigned By    Initials Name Provider Type    Debbie Heredia CCC-SLP Speech and Language Pathologist    Jazmin Le, Speech Therapy Student Speech Therapy Student          OP SLP Education     Row Name 01/31/20 0900       Education    Barriers to Learning  No barriers identified  -KG (r) KB (t) KG (c)    Education Comments  Education completed with mom after the session.   -KG      User Key  (r) = Recorded By, (t) = Taken By, (c) = Cosigned By    Initials Name Effective Dates    Debbie Heredia CCC-SLP 02/05/19 -     Jazmin Le, Speech Therapy Student 12/17/19 -              Time Calculation:                       AVRIL Granados  1/31/2020   Risks/benefits discussed with patient/surrogate

## 2020-05-04 ENCOUNTER — EMERGENCY (EMERGENCY)
Facility: HOSPITAL | Age: 25
LOS: 1 days | Discharge: HOME | End: 2020-05-04
Attending: EMERGENCY MEDICINE
Payer: COMMERCIAL

## 2020-05-04 VITALS
DIASTOLIC BLOOD PRESSURE: 87 MMHG | OXYGEN SATURATION: 99 % | HEIGHT: 66 IN | TEMPERATURE: 98 F | WEIGHT: 154.98 LBS | SYSTOLIC BLOOD PRESSURE: 129 MMHG | RESPIRATION RATE: 22 BRPM | HEART RATE: 122 BPM

## 2020-05-04 VITALS
HEART RATE: 80 BPM | RESPIRATION RATE: 20 BRPM | SYSTOLIC BLOOD PRESSURE: 124 MMHG | TEMPERATURE: 98 F | DIASTOLIC BLOOD PRESSURE: 65 MMHG | OXYGEN SATURATION: 100 %

## 2020-05-04 PROCEDURE — 93010 ELECTROCARDIOGRAM REPORT: CPT

## 2020-05-04 PROCEDURE — 93005 ELECTROCARDIOGRAM TRACING: CPT

## 2020-05-04 PROCEDURE — 99283 EMERGENCY DEPT VISIT LOW MDM: CPT

## 2020-05-04 PROCEDURE — 99284 EMERGENCY DEPT VISIT MOD MDM: CPT

## 2020-05-04 NOTE — ED PROVIDER NOTE - PROGRESS NOTE DETAILS
Patient is reassessed, states feeling much better at this time. HR improved to 85. Patient has a ride to go home. Pt ambulatory tolerating PO. RGUJRAL

## 2020-05-04 NOTE — ED PROVIDER NOTE - ATTENDING CONTRIBUTION TO CARE
RGUJRAL 23yo f hx WPW presents with palpitations s/p ingesting a marijuana cookie and a glass of wine. States she was with friends and that's why she participated. Denies any anxiety, depression. Denies any recent illness.   On exam, Patient is awake,alert,oriented x 3. Patient is well appearing and in no acute distress. Patient's chest is clear to ausculation, +s1s2. Abdomen is soft nd/nt +BS. Extremity with no swelling or calf tenderness.   HR improving in ED. EKG reviewed. PO hydration. Monitor in ED and re eval.

## 2020-05-04 NOTE — ED ADULT TRIAGE NOTE - CHIEF COMPLAINT QUOTE
Pt complains of "chest pain x 2 hours s/p ingestion of marijuana cookie" Pt complains of "chest pain x 2 hours s/p ingestion of marijuana cookie" Denies fevers/cough/SOB, no known COVID exposure

## 2020-05-04 NOTE — ED PROVIDER NOTE - NSFOLLOWUPINSTRUCTIONS_ED_ALL_ED_FT
1- Rest drink lots of fluids  2- Avoid marijuana  3- If you have any new or worsening symptoms come back to the ER immediately

## 2020-05-04 NOTE — ED PROVIDER NOTE - NSFOLLOWUPCLINICS_GEN_ALL_ED_FT
[facility] Psych Dept of Substance Abuse  Psychiatry Substance Abuse  [address] [place] [address]  Phone: [phone]  Fax:   Follow Up Time:

## 2020-05-04 NOTE — ED ADULT NURSE NOTE - OBJECTIVE STATEMENT
24YOF  pmh wpw s/p ablasion presents to ED c/o CP. Pt states she had half a cookie with weed (about 250mg). Pt states she also had some wine. Pt states she has been having palpitations and chest pressure. denies SOB, cough, fever, chills, abd pain, N/V/D, burning upon urination. Safety and comfort measures provided. Will continue to monitor.

## 2020-05-04 NOTE — ED PROVIDER NOTE - PATIENT PORTAL LINK FT
You can access the FollowMyHealth Patient Portal offered by [facility] by registering at the following website: [email or web address]. By joining [employer]’s FollowMyHealth portal, you will also be able to view your health information using other applications (apps) compatible with our system.

## 2020-05-04 NOTE — ED PROVIDER NOTE - OBJECTIVE STATEMENT
24 year old F with pmhx of, WPW (ablation with Dr. Blanco at [facility]) and ovarian cyst  presents to ED c/o palpitations and chest pressure x2 hours s/p ingestion of marijuana laced cookie today. Pt states that she ate less then half of a 250mg the marijuana cookie while drinking some wine. Pt also endorses mild difficulty breathing and swallowing. Denies fever, cough, SOB, abd pain, nausea, and vomiting. Allergic to penicillin. 24 year old F with pmhx of, WPW (ablation with Dr. Blanco at [facility]) and ovarian cyst  presents to ED c/o palpitations and chest pressure x2 hours s/p ingestion of marijuana laced cookie today. Pt states she ate less than half of a 250mg marijuana cookie while drinking some wine. Pt also endorses mild difficulty breathing and swallowing. Denies fever, cough, SOB, abd pain, nausea, and vomiting. Allergic to penicillin.

## 2020-05-04 NOTE — ED ADULT NURSE NOTE - CHIEF COMPLAINT QUOTE
Pt complains of "chest pain x 2 hours s/p ingestion of marijuana cookie" Denies fevers/cough/SOB, no known COVID exposure

## 2020-07-19 ENCOUNTER — EMERGENCY (EMERGENCY)
Facility: HOSPITAL | Age: 25
LOS: 1 days | Discharge: HOME | End: 2020-07-19
Attending: STUDENT IN AN ORGANIZED HEALTH CARE EDUCATION/TRAINING PROGRAM
Payer: MEDICAID

## 2020-07-19 VITALS
HEART RATE: 77 BPM | RESPIRATION RATE: 17 BRPM | OXYGEN SATURATION: 100 % | DIASTOLIC BLOOD PRESSURE: 77 MMHG | SYSTOLIC BLOOD PRESSURE: 108 MMHG | TEMPERATURE: 98 F

## 2020-07-19 VITALS
HEART RATE: 72 BPM | TEMPERATURE: 98 F | DIASTOLIC BLOOD PRESSURE: 81 MMHG | WEIGHT: 158.07 LBS | SYSTOLIC BLOOD PRESSURE: 132 MMHG | HEIGHT: 68 IN | RESPIRATION RATE: 20 BRPM

## 2020-07-19 LAB
ALBUMIN SERPL ELPH-MCNC: 4.6 G/DL — SIGNIFICANT CHANGE UP (ref 3.3–5)
ALP SERPL-CCNC: 41 U/L — SIGNIFICANT CHANGE UP (ref 40–120)
ALT FLD-CCNC: 8 U/L — LOW (ref 10–45)
ANION GAP SERPL CALC-SCNC: 13 MMOL/L — SIGNIFICANT CHANGE UP (ref 5–17)
APPEARANCE UR: CLEAR — SIGNIFICANT CHANGE UP
AST SERPL-CCNC: 19 U/L — SIGNIFICANT CHANGE UP (ref 10–40)
BACTERIA # UR AUTO: ABNORMAL
BASOPHILS # BLD AUTO: 0.05 K/UL — SIGNIFICANT CHANGE UP (ref 0–0.2)
BASOPHILS NFR BLD AUTO: 0.9 % — SIGNIFICANT CHANGE UP (ref 0–2)
BILIRUB SERPL-MCNC: 0.2 MG/DL — SIGNIFICANT CHANGE UP (ref 0.2–1.2)
BILIRUB UR-MCNC: NEGATIVE — SIGNIFICANT CHANGE UP
BUN SERPL-MCNC: 8 MG/DL — SIGNIFICANT CHANGE UP (ref 7–23)
CALCIUM SERPL-MCNC: 9.7 MG/DL — SIGNIFICANT CHANGE UP (ref 8.4–10.5)
CHLORIDE SERPL-SCNC: 104 MMOL/L — SIGNIFICANT CHANGE UP (ref 96–108)
CO2 SERPL-SCNC: 21 MMOL/L — LOW (ref 22–31)
COLOR SPEC: SIGNIFICANT CHANGE UP
CREAT SERPL-MCNC: 0.54 MG/DL — SIGNIFICANT CHANGE UP (ref 0.5–1.3)
DIFF PNL FLD: NEGATIVE — SIGNIFICANT CHANGE UP
EOSINOPHIL # BLD AUTO: 0.41 K/UL — SIGNIFICANT CHANGE UP (ref 0–0.5)
EOSINOPHIL NFR BLD AUTO: 7.4 % — HIGH (ref 0–6)
EPI CELLS # UR: 4 /HPF — SIGNIFICANT CHANGE UP
GLUCOSE SERPL-MCNC: 100 MG/DL — HIGH (ref 70–99)
GLUCOSE UR QL: NEGATIVE — SIGNIFICANT CHANGE UP
HCG UR QL: NEGATIVE — SIGNIFICANT CHANGE UP
HCT VFR BLD CALC: 40 % — SIGNIFICANT CHANGE UP (ref 34.5–45)
HGB BLD-MCNC: 13.2 G/DL — SIGNIFICANT CHANGE UP (ref 11.5–15.5)
HYALINE CASTS # UR AUTO: 4 /LPF — HIGH (ref 0–2)
IMM GRANULOCYTES NFR BLD AUTO: 0.4 % — SIGNIFICANT CHANGE UP (ref 0–1.5)
KETONES UR-MCNC: NEGATIVE — SIGNIFICANT CHANGE UP
LEUKOCYTE ESTERASE UR-ACNC: NEGATIVE — SIGNIFICANT CHANGE UP
LYMPHOCYTES # BLD AUTO: 1.73 K/UL — SIGNIFICANT CHANGE UP (ref 1–3.3)
LYMPHOCYTES # BLD AUTO: 31.4 % — SIGNIFICANT CHANGE UP (ref 13–44)
MCHC RBC-ENTMCNC: 28.8 PG — SIGNIFICANT CHANGE UP (ref 27–34)
MCHC RBC-ENTMCNC: 33 GM/DL — SIGNIFICANT CHANGE UP (ref 32–36)
MCV RBC AUTO: 87.1 FL — SIGNIFICANT CHANGE UP (ref 80–100)
MONOCYTES # BLD AUTO: 0.59 K/UL — SIGNIFICANT CHANGE UP (ref 0–0.9)
MONOCYTES NFR BLD AUTO: 10.7 % — SIGNIFICANT CHANGE UP (ref 2–14)
NEUTROPHILS # BLD AUTO: 2.71 K/UL — SIGNIFICANT CHANGE UP (ref 1.8–7.4)
NEUTROPHILS NFR BLD AUTO: 49.2 % — SIGNIFICANT CHANGE UP (ref 43–77)
NITRITE UR-MCNC: NEGATIVE — SIGNIFICANT CHANGE UP
NRBC # BLD: 0 /100 WBCS — SIGNIFICANT CHANGE UP (ref 0–0)
PH UR: 7 — SIGNIFICANT CHANGE UP (ref 5–8)
PLATELET # BLD AUTO: 293 K/UL — SIGNIFICANT CHANGE UP (ref 150–400)
POTASSIUM SERPL-MCNC: 4.3 MMOL/L — SIGNIFICANT CHANGE UP (ref 3.5–5.3)
POTASSIUM SERPL-SCNC: 4.3 MMOL/L — SIGNIFICANT CHANGE UP (ref 3.5–5.3)
PROT SERPL-MCNC: 7.4 G/DL — SIGNIFICANT CHANGE UP (ref 6–8.3)
PROT UR-MCNC: NEGATIVE — SIGNIFICANT CHANGE UP
RBC # BLD: 4.59 M/UL — SIGNIFICANT CHANGE UP (ref 3.8–5.2)
RBC # FLD: 14.5 % — SIGNIFICANT CHANGE UP (ref 10.3–14.5)
RBC CASTS # UR COMP ASSIST: 1 /HPF — SIGNIFICANT CHANGE UP (ref 0–4)
SODIUM SERPL-SCNC: 138 MMOL/L — SIGNIFICANT CHANGE UP (ref 135–145)
SP GR SPEC: 1.01 — LOW (ref 1.01–1.02)
UROBILINOGEN FLD QL: NEGATIVE — SIGNIFICANT CHANGE UP
WBC # BLD: 5.51 K/UL — SIGNIFICANT CHANGE UP (ref 3.8–10.5)
WBC # FLD AUTO: 5.51 K/UL — SIGNIFICANT CHANGE UP (ref 3.8–10.5)
WBC UR QL: 1 /HPF — SIGNIFICANT CHANGE UP (ref 0–5)

## 2020-07-19 PROCEDURE — 93010 ELECTROCARDIOGRAM REPORT: CPT

## 2020-07-19 PROCEDURE — 99285 EMERGENCY DEPT VISIT HI MDM: CPT

## 2020-07-19 PROCEDURE — 80053 COMPREHEN METABOLIC PANEL: CPT

## 2020-07-19 PROCEDURE — 96374 THER/PROPH/DIAG INJ IV PUSH: CPT | Mod: XU

## 2020-07-19 PROCEDURE — 99284 EMERGENCY DEPT VISIT MOD MDM: CPT | Mod: 25

## 2020-07-19 PROCEDURE — 81025 URINE PREGNANCY TEST: CPT

## 2020-07-19 PROCEDURE — 81001 URINALYSIS AUTO W/SCOPE: CPT

## 2020-07-19 PROCEDURE — 70498 CT ANGIOGRAPHY NECK: CPT | Mod: 26

## 2020-07-19 PROCEDURE — 70496 CT ANGIOGRAPHY HEAD: CPT | Mod: 26

## 2020-07-19 PROCEDURE — 70496 CT ANGIOGRAPHY HEAD: CPT

## 2020-07-19 PROCEDURE — 85027 COMPLETE CBC AUTOMATED: CPT

## 2020-07-19 PROCEDURE — 93005 ELECTROCARDIOGRAM TRACING: CPT

## 2020-07-19 PROCEDURE — 70498 CT ANGIOGRAPHY NECK: CPT

## 2020-07-19 RX ORDER — MECLIZINE HCL 12.5 MG
25 TABLET ORAL ONCE
Refills: 0 | Status: COMPLETED | OUTPATIENT
Start: 2020-07-19 | End: 2020-07-19

## 2020-07-19 RX ORDER — SODIUM CHLORIDE 9 MG/ML
1000 INJECTION INTRAMUSCULAR; INTRAVENOUS; SUBCUTANEOUS ONCE
Refills: 0 | Status: COMPLETED | OUTPATIENT
Start: 2020-07-19 | End: 2020-07-19

## 2020-07-19 RX ORDER — METOCLOPRAMIDE HCL 10 MG
1 TABLET ORAL
Qty: 16 | Refills: 0
Start: 2020-07-19 | End: 2020-07-22

## 2020-07-19 RX ORDER — ACETAMINOPHEN 500 MG
975 TABLET ORAL ONCE
Refills: 0 | Status: COMPLETED | OUTPATIENT
Start: 2020-07-19 | End: 2020-07-19

## 2020-07-19 RX ORDER — METOCLOPRAMIDE HCL 10 MG
10 TABLET ORAL ONCE
Refills: 0 | Status: COMPLETED | OUTPATIENT
Start: 2020-07-19 | End: 2020-07-19

## 2020-07-19 RX ADMIN — Medication 975 MILLIGRAM(S): at 11:59

## 2020-07-19 RX ADMIN — Medication 10 MILLIGRAM(S): at 12:00

## 2020-07-19 RX ADMIN — SODIUM CHLORIDE 1000 MILLILITER(S): 9 INJECTION INTRAMUSCULAR; INTRAVENOUS; SUBCUTANEOUS at 12:00

## 2020-07-19 RX ADMIN — Medication 25 MILLIGRAM(S): at 13:36

## 2020-07-19 NOTE — ED PROVIDER NOTE - PHYSICAL EXAMINATION
CONSTITUTIONAL: Nontoxic, well nourished, well developed, young female, resting comfortably in no acute distress  HEAD: Normocephalic; atraumatic  EYES: Normal inspection, EOMI, PERRLA  ENMT: External appears normal; normal oropharynx  NECK: Supple; non-tender; no cervical lymphadenopathy  CARD: RRR; no audible murmurs, rubs, or gallops  RESP: No respiratory distress, lungs ctab/l  ABD: Soft, non-distended; non-tender; no rebound or guarding  EXT: No LE pitting edema or calf tenderness; distal pulses intact with good capillary refill  SKIN: Warm, dry, intact  NEURO: aaox3, CN II-IX intact, 5/5 strength b/l UE and LE, sensation intact in all extremities, no pronator drift, ambulates with a steady gait, finger to nose intact, no disdiadokinesia CONSTITUTIONAL: Nontoxic, well nourished, well developed, young female, resting comfortably in no acute distress  HEAD: Normocephalic; atraumatic  EYES: Normal inspection, EOMI, PERRLA  ENMT: External appears normal; normal oropharynx  NECK: Supple; non-tender; no cervical lymphadenopathy  CARD: RRR; no audible murmurs, rubs, or gallops  RESP: No respiratory distress, lungs ctab/l  ABD: Soft, non-distended; non-tender; no rebound or guarding  EXT: No LE pitting edema or calf tenderness; distal pulses intact with good capillary refill  SKIN: Warm, dry, intact  NEURO: aaox3, CN II-IX intact, 5/5 strength b/l UE and LE, sensation intact in all extremities, no pronator drift, ambulates with a steady gait, mildly unsteady with heel to toe walk, finger to nose intact, no disdiadokinesia CONSTITUTIONAL: Nontoxic, well nourished, well developed, young female, resting comfortably in no acute distress  HEAD: Normocephalic; atraumatic  EYES: Normal inspection, EOMI, PERRLA  ENMT: External appears normal; normal oropharynx  NECK: Supple; non-tender; no cervical lymphadenopathy  CARD: RRR; no audible murmurs, rubs, or gallops  RESP: No respiratory distress, lungs ctab/l  ABD: Soft, non-distended; non-tender; no rebound or guarding  EXT: No LE pitting edema or calf tenderness; distal pulses intact with good capillary refill  SKIN: Warm, dry, intact  NEURO: aaox3, CN II-IX intact, 5/5 strength b/l UE and LE, sensation intact in all extremities, no pronator drift, ambulates with a steady gait, +mildly unsteady with heel to toe walk, finger to nose intact, no disdiadokinesia, negative rombergs

## 2020-07-19 NOTE — ED ADULT NURSE NOTE - OBJECTIVE STATEMENT
25y female with hx of SVT presents to the ER c/o multiple medical complaints. Pt is alert and oriented x 4 and speaking coherently. Pt states that she was given flagyl on Friday for her BV and then on Sunday night after taking her 2nd dose of the day began to have several neurological issues. Pt states she was having memory issues while driving "I forgot where I was driving in my own town", began to have a pounding in the back of the left occiput, nausea, slurred speech, and unsteady gait. Pt states the slurred speech has improved, but is having intermittent unsteady gait. Pt denies cp, sob, vomiting, fevers. Pt gait steady at this time. Pt c/o dizziness at this time, pins and needles in b/l hands, and a headache. Pt vss. pt on CM. Will reassess.

## 2020-07-19 NOTE — ED PROVIDER NOTE - NSFOLLOWUPINSTRUCTIONS_ED_ALL_ED_FT
-Follow up with neurology Dr. Coleman in 24-48 hours.   -Take acetaminophen (Tylenol) for pain as needed. Take 2 regular strength acetaminophen (650mg) or 1 extra strength (500mg) of acetaminophen every 8 hours. If you have any questions please consult a pharmacist or your PMD.   -Take Reglan 10mg every 6 hours as needed for nausea/vomiting.   -A copy of resulted labs, imaging, and findings have been provided to you.   -As we discussed, please return to the Emergency Department if you experience any new/worsening symptoms, including, but are not limited to: unrelenting nausea, vomiting, fever, neck stiffness, syncope, headache that does not resolve, numbness or tingling, loss of sensation, loss of motor function, or any other concerning symptoms.  -If you have issues obtaining follow up, please call: [phone] to obtain a doctor or specialist who takes your insurance in your area.  You may call [phone] to make an appointment with the internal medicine clinic.

## 2020-07-19 NOTE — ED PROVIDER NOTE - CARE PLAN
Principal Discharge DX:	Headache Principal Discharge DX:	Headache  Secondary Diagnosis:	Numbness and tingling in right hand  Secondary Diagnosis:	Dizziness

## 2020-07-19 NOTE — ED PROVIDER NOTE - PATIENT PORTAL LINK FT
You can access the FollowMyHealth Patient Portal offered by [facility] by registering at the following website: [email or web address]. By joining [employer]’s FollowMyHealth portal, you will also be able to view your health information using other applications (apps) compatible with our system.

## 2020-07-19 NOTE — ED PROVIDER NOTE - NS ED ROS FT
General: denies fever, chills  HENT: denies nasal congestion, sore throat, rhinorrhea  Eyes: +vision changes  CV: denies chest pain  Resp: denies difficulty breathing, cough  Abdominal: +nausea, denies vomiting, diarrhea, abdominal pain, blood in stool, dark stool  : denies pain with urination  MSK: denies recent trauma  Neuro: +headaches, +numbness, denies tingling, dizziness, lightheadedness.  Skin: denies new rashes  Endocrine: denies recent weight loss

## 2020-07-19 NOTE — ED PROVIDER NOTE - ATTENDING CONTRIBUTION TO CARE
Robles DO: 25F hx wpw s/p ablation, presenting w/ dysarthria lasting 'a few hours' and right arm weakness of unknown duration (now resolved) 7 days ago, with left sided pounding/pressure like occipital headache that developed gradually over the last 7 days, with sensation of generalized weakness and intermittent peripheral extremity tingling sensations, and persistent sensation that she is 'off balance' when she walks. Denies vision changes or diplopia, states there is no positional or temporal association with symptoms, no fevers/ chills/ abd pain/ nausea/ vomiting/ chest pain/ palpitations/ sob/ ear pain. Only takes progesterone birth control tablet daily but stopped it 7 days ago when symptoms started, stated that she had been treated for bacterial vaginosis but stopped it also 7 days ago- only had 6 tablets. States she has no vaginal discharge or dysuria. On exam: Robles DO: 25F hx wpw s/p ablation, presenting w/ dysarthria lasting 'a few hours' and right arm weakness of unknown duration (now resolved) 7 days ago, with left sided pounding/pressure like occipital headache that developed gradually over the last 7 days, with sensation of generalized weakness and intermittent peripheral extremity tingling sensations, and persistent sensation that she is 'off balance' when she walks, as well as some photosensitivity of left eye. Denied vision changes or diplopia initially but did endorse diploplia to resident doctor. States there is no positional or temporal association with symptoms, no fevers/ chills/ abd pain/ nausea/ vomiting/ chest pain/ palpitations/ sob/ ear pain. No room spinning sensation or pre-syncope sensation. States she is 'off-balance.' Only takes progesterone birth control tablet daily but stopped it 7 days ago when symptoms started, stated that she had been treated for bacterial vaginosis but stopped it also 7 days ago- only had 6 tablets. States she has no vaginal discharge or dysuria. Denies alcohol/drug use. Exam:  Gen: AAOx3, NAD  Head: NCAT  ENT: Airway patent, moist mucous membranes, nasal passageways clear, no pharyngeal erythema or exudates, uvula midline, no cervical lymphadenopathy, EOMI, b/l PERRLA , TM's clear b/l.   Cardiac: Normal rate, normal rhythm, no murmurs  Respiratory: Lungs CTA B/L  Gastrointestinal: Abdomen soft, nontender, nondistended, no rebound, no guarding  MSK: No gross abnormalities, FROM of all four extremities, no edema  HEME: Extremities warm, pulses intact and symmetrical in all four extremities  Skin: No rashes, no lesions  Neuro: No gross neurologic deficits, CN II-XII intact, no facial asymmetry, no dysarthria, no dysmetria, no drift, neg rombergs, normal heel shin test, no nystagmus, + difficulty with heel-toe ambulation, strength equal in all four extremities, no gait abnormality when walking normally, no focal sensory deficits.   MDM: pt with varying reported neuro symptoms, no preceding injury or trauma, but given reported persistence of right arm weakness and various reported sensory deficits, as well as some abnormality in heel-toe ambulation, will eval cta head/neck for possible dissection. Will consider neuro eval if symptoms persistent as there was reported photosensitivity, memory issues, and temporally varying symptoms that could be early signs of MS. Possible complex migraine but symptoms started before headache began. Will check labs, trial migraine tx, no nystagmus or reproducible symptoms so unlikely bppv. ekg sinus.

## 2020-07-19 NOTE — ED PROVIDER NOTE - OBJECTIVE STATEMENT
Fatmata Amos MD: 24yo F with PMH of WPW s/p ablation who presents with 1 week of headache, nausea, memory loss, increased fatigue. Pt also endorses double vision and difficulty walking which is now resolved. Headache is L sided, pressure like, persistent, associated with nausea, and L eye photophobia. Pt was driving 1 week ago, when she experienced memory loss and did not know where she was driving to along with new onset HA and R arm numbness. Never experienced HA before. No FHx of migraines. No head trauma or syncope. No vomiting, abdominal pain, rash, recent sick contacts, recent travel, URI symptoms, weakness, chest pain, neck stiffness, SOB, fever. Started Flagyl 9 days ago for bacterial vaginosis, stopped after 3 doses. No EtOH or substance use. Fatmata Amos MD: 26yo F with PMH of WPW s/p ablation who presents with 1 week of headache, nausea, memory loss, increased fatigue. Pt also endorses double vision and difficulty walking which is now resolved. Headache is L sided, pressure like, persistent, associated with nausea, and L eye photosensitivity. Pt was driving 1 week ago, when she experienced memory loss and did not know where she was driving to along with new onset HA and R arm numbness. Never experienced HA before. No FHx of migraines. No head trauma or syncope. No vomiting, abdominal pain, rash, recent sick contacts, recent travel, URI symptoms, weakness, chest pain, neck stiffness, SOB, fever. Started Flagyl 9 days ago for bacterial vaginosis, stopped after 3 doses. No EtOH or substance use.

## 2020-07-19 NOTE — ED PROVIDER NOTE - PROGRESS NOTE DETAILS
Robles DO: pt states improvement but still feels imbalanced, therefore will obtain neuro consult, pt does have difficulty with heel toe walk which at her age and her state of health, should not have this difficulty with, trialing meclizine will reassess Fatmata Amos MD: Labs wnl, CTA head and neck unremarkable. Pt able to ambulate without difficulty and tolerate PO. HA improved with meds, but still complaining of persistent lightheadedness. Vitals remain stable. Pt well appearing. Evaluated by neurology who recommends outpatient neurology f/u. Will dc with precautions Robles DO: dizziness still mild but pt states significantly improved, instructed on return precautions, neuro has evaluated and placed recommendations, stable for dc

## 2020-07-19 NOTE — ED PROVIDER NOTE - CLINICAL SUMMARY MEDICAL DECISION MAKING FREE TEXT BOX
Fatmata Amos MD: Young 26yo F with PMH of WPW s/p ablation who presents with new onset 1 week of headache with multiple associated symptoms including memory loss and transient numbness of R arm. Pt hemodynamically stable, afebrile. No meningismus. Pt is not altered and does not have any FNDs except for mild difficulty with heel to toe walking. No trauma. Ddx includes complex migraine, bleed, mass. Labs, EKG, CTA head and neck and neurology consult.

## 2020-07-19 NOTE — CONSULT NOTE ADULT - ASSESSMENT
Assessment: 25 year old female, PMH WPW Syndrome s/p ablation 2017, presents to the ED for HA and lightheadedness. Approximately 1 week prior on Sunday 7/12/20, patient states she had slurred speech and memory loss. Duration of slurred speech lasted until the next morning Monday. Duration of the memory loss was approximately 15minutes according to patient, where she could not remember directions or destination while driving her sister home. She also endorses pins and needles sensation starting Sunday and lasting until Monday morning. Her HA and lightheadedness symptoms started Sunday and has not resolved, for which her sister brought her to the ED. The HA is described as pressure-like starting in the left frontal region and extending to the left occipital region. The HA waxes and wanes, and not associated with photophobia, nausea, vomiting, watery eyes, vision loss. Exacerbation w/ position change is denied. Denies ear ringing or exacerbation w/ exertion. No h/o migraines or mental health disorders. Patient was on birth control pills and flagyl, for which she stopped taking the flagyl last Sunday. She states she was on flagyl for bacterial vaginosis.  Given tylenol and reglan in the ED, for which sx are improved.     Impression: Initial sx of slurred speech and pins and needles sensation resolved. Still experiencing HA and lightheadedness. Imaging negative for acute pathologies. Etiologies can be migraine vs cluster HA vs tension HA.      Plan:  []f/u w/ outpatient neurologist Dr. Coleman  []Continue sx control w/ tylenol and reglan    Dispo: home    -Management & disposition discussed with neuro attending, Dr. Espinosa

## 2020-07-19 NOTE — CONSULT NOTE ADULT - SUBJECTIVE AND OBJECTIVE BOX
HPI: 25 year old female, PMH WPW Syndrome s/p ablation, presents to the ED for HA and lightheadedness. Approximately 1 week prior on 20, patient states she had slurred speech and memory loss. Duration of slurred speech lasted until the next morning Monday. Duration of the memory loss was approximately 15minutes according to patient. She also endorses pins and needles sensation starting  and lasting until Monday morning. Her HA and lightheadedness symptoms started  and has not resolved, for which her sister brought her to the ED. The HA is described as pressure-like starting in the left frontal region and extending to the left occipital region. The HA waxes and wanes, and not associated with photophobia, nausea, vomiting, watery eyes, vision loss. Exacerbation w/ position change is denied. Denies ear ringing or exacerbation w/ exertion. No h/o migraines or mental health disorders. Patient was on birth control pills and flagyl, for which she stopped taking the flagyl last . She states she was on flagyl for bacterial vaginosis.  Given tylenol and reglan in the ED, for which sx are improved.     PAST MEDICAL & SURGICAL HISTORY:  Palpitation  SVT (supraventricular tachycardia)  Ovarian cyst  No significant past surgical history    FAMILY HISTORY:  No pertinent family history in first degree relatives    MEDICATIONS (HOME):  Home Medications:  acetaminophen 325 mg oral tablet: 3 tab(s) orally every 6 hours, As needed, Mild Pain (1 - 3), Moderate Pain (4 - 6) (19 Dec 2018 07:54)    MEDICATIONS  (STANDING):    MEDICATIONS  (PRN):    ALLERGIES/INTOLERANCES:  Allergies  amoxicillin (Hives)  penicillin (Anaphylaxis)  Pine apple (Swelling; Rash)    Intolerances    VITALS & EXAMINATION:  Vital Signs Last 24 Hrs  T(C): 36.8 (2020 11:02), Max: 36.8 (2020 11:02)  T(F): 98.2 (2020 11:02), Max: 98.2 (2020 11:02)  HR: 78 (2020 11:35) (72 - 78)  BP: 131/90 (2020 11:35) (131/90 - 132/81)  BP(mean): --  RR: 18 (2020 11:35) (18 - 20)  SpO2: 100% (2020 11:35) (100% - 100%)    General:  Constitutional: Obese Female, appears stated age, in no apparent distress including pain  Head: Normocephalic & atraumatic.    Neurological (>12):  MS: Awake, alert, oriented to person, place, situation, time. Normal affect. Follows all commands.    Language: Speech is clear, fluent with good repetition & comprehension (able to name pen and blue glove)    CNs: PERRLA (R = 3mm, L = 3mm). VFF. EOMI no nystagmus. V1-3 intact to LT/pinprick, well developed masseter muscles b/l. No facial asymmetry b/l, full eye closure strength b/l. Hearing grossly normal (rubbing fingers) b/l. Gag reflex deferred. Head turning & shoulder shrug intact b/l. Tongue midline, normal movements, no atrophy.    Motor: Normal muscle bulk & tone. No noticeable tremor or seizure. No pronator drift.              Deltoid	Biceps	Triceps	Wrist	   R	5	5	5	5	5		 	  L	5	5	5	5	5		    	H-Flex	H-Ext	K-Flex	K-Ext	D-Flex	P-Flex  R	5	5	5	5	5	5		 	   L	5	5	5	5	5	5			     Sensation: Intact to LT/PP/Temp/Vibration/Position b/l throughout.     Cortical: Extinction on DSS (neglect): none    Reflexes             Patellar(L4)    Achilles(S1)    Plantar Resp  R	2	          2		   Down   L	2	          2		   Down     Coordination: intact rapid-alt movements. No dysmetria to FTN    Gait: No postural instability. Normal stance and tandem gait.     LABORATORY:  CBC                       13.2   5.51  )-----------( 293      ( 2020 11:46 )             40.0     Chem 07-19    138  |  104  |  8   ----------------------------<  100<H>  4.3   |  21<L>  |  0.54    Ca    9.7      2020 11:46    TPro  7.4  /  Alb  4.6  /  TBili  0.2  /  DBili  x   /  AST  19  /  ALT  8<L>  /  AlkPhos  41  07-19    LFTs LIVER FUNCTIONS - ( 2020 11:46 )  Alb: 4.6 g/dL / Pro: 7.4 g/dL / ALK PHOS: 41 U/L / ALT: 8 U/L / AST: 19 U/L / GGT: x           U/A Urinalysis Basic - ( 2020 12:02 )    Color: Light Yellow / Appearance: Clear / S.009 / pH: x  Gluc: x / Ketone: Negative  / Bili: Negative / Urobili: Negative   Blood: x / Protein: Negative / Nitrite: Negative   Leuk Esterase: Negative / RBC: 1 /hpf / WBC 1 /HPF   Sq Epi: x / Non Sq Epi: 4 /hpf / Bacteria: Few    STUDIES & IMAGING:  Radiology (XR, CT, MR, U/S, TTE/ELDER):  BRAIN CT:   No hydrocephalus, acute intracranial hemorrhage, mass effect, or brain edema.     BRAIN CTA:   No vascular aneurysm, flow-limiting stenosis, or evidence for AVM.     NECK CTA:   No flow-limiting stenosis or evidence for arterial dissection.

## 2020-07-19 NOTE — ED ADULT NURSE REASSESSMENT NOTE - NS ED NURSE REASSESS COMMENT FT1
pt tolerating po, vss.
pt still c/o dizziness but states her ha has improved. vss, pt educated to f/u with neurology.

## 2020-07-19 NOTE — ED PROVIDER NOTE - CARE PROVIDER_API CALL
J Luis Coleman  NEUROLOGY  [address] [facility] [place], [address]  [place] [address]  Phone: [phone]  Fax: [phone]  Follow Up Time:

## 2020-08-11 NOTE — BRIEF OPERATIVE NOTE - PRE-OP
LOV:1/21/2020  Labs: future labs ordered 1/21/20. Last completed labs on 11/21/19    Last Comprehensive Metabolic Panel:  Sodium   Date Value Ref Range Status   11/21/2019 136 134 - 144 mmol/L Final     Potassium   Date Value Ref Range Status   11/21/2019 4.3 3.5 - 5.2 mmol/L Final     Chloride   Date Value Ref Range Status   11/21/2019 102 96 - 106 mmol/L Final     Glucose   Date Value Ref Range Status   11/21/2019 153 (H) 65 - 99 mg/dL Final     Urea Nitrogen   Date Value Ref Range Status   11/21/2019 16 6 - 24 mg/dL Final     BUN/Creatinine Ratio   Date Value Ref Range Status   11/21/2019 26 (H) 9 - 23 Final     Creatinine   Date Value Ref Range Status   11/21/2019 0.61 0.57 - 1.00 mg/dL Final     Calcium   Date Value Ref Range Status   11/21/2019 9.2 8.7 - 10.2 mg/dL Final     
<<-----Click on this checkbox to enter Pre-Op Dx

## 2021-01-05 ENCOUNTER — APPOINTMENT (OUTPATIENT)
Dept: COLORECTAL SURGERY | Facility: CLINIC | Age: 26
End: 2021-01-05
Payer: COMMERCIAL

## 2021-01-05 VITALS
DIASTOLIC BLOOD PRESSURE: 77 MMHG | SYSTOLIC BLOOD PRESSURE: 117 MMHG | HEIGHT: 66 IN | HEART RATE: 69 BPM | BODY MASS INDEX: 25.71 KG/M2 | TEMPERATURE: 97.4 F | WEIGHT: 160 LBS

## 2021-01-05 DIAGNOSIS — K64.8 RESIDUAL HEMORRHOIDAL SKIN TAGS: ICD-10-CM

## 2021-01-05 DIAGNOSIS — K64.4 RESIDUAL HEMORRHOIDAL SKIN TAGS: ICD-10-CM

## 2021-01-05 PROCEDURE — 99203 OFFICE O/P NEW LOW 30 MIN: CPT | Mod: 25

## 2021-01-05 PROCEDURE — 99072 ADDL SUPL MATRL&STAF TM PHE: CPT

## 2021-01-05 PROCEDURE — 46600 DIAGNOSTIC ANOSCOPY SPX: CPT

## 2021-01-05 NOTE — HISTORY OF PRESENT ILLNESS
[FreeTextEntry1] : Ms. Miller presents to the office for consultation with regards to internal and external hemorrhoids.  She reports a history of IBS–diarrhea predominant.  Because of her frequent episodes of loose stools, she is experiencing hemorrhoidal swelling and pressure.  She notes very occasional rectal bleeding with blood noted on the toilet.  She denies an increase in frequency or amount of blood passed per rectum.  No hemorrhoidal burning or itching noted.  She also reports a residual hemorrhoidal skin tag that she would like to have removed.  It has been present for at least 6 months.

## 2021-01-05 NOTE — ASSESSMENT
[FreeTextEntry1] : Ms. Miller presents to the office for bleeding internal and external hemorrhoids.  We discussed the fact that all individuals possess hemorrhoids, but they are not notable, except in their symptomatic state. The pathophysiology of hemorrhoidal flares were reviewed, including the contribution of straining, hard stools, diarrheal stools in precipitating the symptoms. In order to regulate bowel regimen and prevent additional flares, recommendations were made for a high fiber diet (25-30g daily)  with ample water intake (80oz daily) +/- MiraLax daily. To alleviate the current hemorrhoidal discomfort, Analpram cream 2.5% or its equivalent will be prescribed tid to be applied intra-anally for at least 2 weeks. I have cautioned the patient that symptoms may not improve for 2-3 days time, as the cream slowly becomes effective. If there is no clinical improvement with this above regimen, a return visit is warranted for further evaluation.\par \par With regards to her external hemorrhoid, it is very diminutive if not a variant of normal anatomy.\par Should she so desire excision, the methods for us doing so were reviewed and discussed, and she will schedule accordingly.

## 2021-01-05 NOTE — PHYSICAL EXAM
[Normal rectal exam] : exam was normal [Reduce Spontaneously] : a spontaneously reducible (grade II) [Skin Tags] : residual hemorrhoidal skin tags were noted [Normal] : was normal [None] : there was no rectal mass  [Gross Blood] : no gross blood [No Rash or Lesion] : No rash or lesion [Alert] : alert [Oriented to Person] : oriented to person [Oriented to Place] : oriented to place [Oriented to Time] : oriented to time [Calm] : calm [de-identified] : very small anterior [de-identified] : No apparent distress [de-identified] : Normocephalic atraumatic [de-identified] : Moving all extremities x4

## 2021-01-06 RX ORDER — HYDROCORTISONE ACETATE, PRAMOXINE HCL 2.5; 1 G/100G; G/100G
2.5-1 CREAM TOPICAL
Qty: 1 | Refills: 3 | Status: DISCONTINUED | COMMUNITY
Start: 2021-01-05 | End: 2021-01-06

## 2021-01-07 ENCOUNTER — RX CHANGE (OUTPATIENT)
Age: 26
End: 2021-01-07

## 2021-02-09 ENCOUNTER — APPOINTMENT (OUTPATIENT)
Dept: GASTROENTEROLOGY | Facility: CLINIC | Age: 26
End: 2021-02-09

## 2021-10-04 ENCOUNTER — APPOINTMENT (OUTPATIENT)
Dept: ELECTROPHYSIOLOGY | Facility: CLINIC | Age: 26
End: 2021-10-04
Payer: COMMERCIAL

## 2021-10-04 ENCOUNTER — NON-APPOINTMENT (OUTPATIENT)
Age: 26
End: 2021-10-04

## 2021-10-04 VITALS
SYSTOLIC BLOOD PRESSURE: 117 MMHG | OXYGEN SATURATION: 97 % | DIASTOLIC BLOOD PRESSURE: 81 MMHG | WEIGHT: 160 LBS | BODY MASS INDEX: 25.71 KG/M2 | HEIGHT: 66 IN | HEART RATE: 72 BPM

## 2021-10-04 DIAGNOSIS — R00.2 PALPITATIONS: ICD-10-CM

## 2021-10-04 PROCEDURE — 93000 ELECTROCARDIOGRAM COMPLETE: CPT

## 2021-10-04 PROCEDURE — 99214 OFFICE O/P EST MOD 30 MIN: CPT

## 2021-10-04 RX ORDER — NORGESTIMATE AND ETHINYL ESTRADIOL 7DAYSX3 28
0.18/0.215/0.25 KIT ORAL DAILY
Refills: 0 | Status: DISCONTINUED | COMMUNITY
Start: 2019-05-15 | End: 2021-10-04

## 2021-10-04 RX ORDER — HYDROCORTISONE 2.5% 25 MG/G
2.5 CREAM TOPICAL
Qty: 1 | Refills: 3 | Status: DISCONTINUED | COMMUNITY
Start: 2021-01-06 | End: 2021-10-04

## 2021-10-04 NOTE — HISTORY OF PRESENT ILLNESS
[FreeTextEntry1] : Contact number: [phone]\par \par Ms. Miller is a 26 year old female who is status post ablation of WPW (posteroseptal pathway) with Dr. Blanco in 2017. She was doing well following ablation but in May 2019 she began noting recurrent onset of palpitations where a Holter monitor was placed which revealed predominantly sinus rhythm with isolated supraventricular ectopies (<1%). She presents today for recurrent palpitations after receiving her first dose of Moderna vaccine on 6/18/21. She had an allergic reason to the vaccine where she had a swollen face and would get palpitations 2-3 times a week. Palpitations occur particularly when she's sleeping and would wake up in sweat. She has no dizziness, syncope or shortness of breath.

## 2021-10-04 NOTE — DISCUSSION/SUMMARY
[Patient] : the patient [FreeTextEntry1] : Ms. Miller is a 26 year old female with WPW and syncope s/p AP ablation 2017, recurrent palpitations few months following ablation w/ Holter monitor revealed isolated supraventricular ectopies (<1%). She now presents with recurrent palpitations 2-3 times a week during sleep following her first dose of Moderna vaccine on 6/18/21. She will be given a one week monitor ([employer]o XT) today and knows to trigger the device during symptom episodes. She will call me 1 1/2 week after mailing out monitor.  \par \par Ms. Miller appeared to understand the whole discussion and verbalized that all of her questions were answered to her satisfaction.\par \par Thank you for allowing me to be involved in the care of this pleasant woman. Please feel free to contact me with any questions.

## 2021-10-04 NOTE — REVIEW OF SYSTEMS
[Palpitations] : palpitations [Negative] : Heme/Lymph [Fever] : no fever [Headache] : no headache [Chills] : no chills [Feeling Fatigued] : not feeling fatigued [Blurry Vision] : no blurred vision [Seeing Double (Diplopia)] : no diplopia [Eye Pain] : no eye pain [Earache] : no earache [Discharge From Ears] : no discharge from the ears [Hearing Loss] : no hearing loss [Sore Throat] : no sore throat [Sinus Pressure] : no sinus pressure [Tinnitus] : no tinnitus [Cough] : no cough [Wheezing] : no wheezing [Coughing Up Blood] : no hemoptysis [Abdominal Pain] : no abdominal pain [Nausea] : no nausea [Vomiting] : no vomiting [Change in Appetite] : no change in appetite [Change In The Stool] : no change in stool [Constipation] : no constipation [Blood in Stool] : no blood in stool [Dysuria] : no dysuria [Pelvic Pain] : no pelvic pain [Abn Vaginal Bleeding] : no unexplained vaginal bleeding [Joint Pain] : no joint pain [Joint Swelling] : no joint swelling [Joint Stiffness] : no joint stiffness [Myalgia] : no myalgia [Rash] : no rash [Itching] : no itching [Skin Lesions] : no skin lesions [Telangiectasias] : no telangiectasias [Dizziness] : no dizziness [Tremor] : no tremor was seen [Convulsions] : no convulsions [Tingling (Paresthesia)] : no tingling [Limb Weakness (Paresis)] : no limb weakness (Paresis) [Confusion] : no confusion was observed [Memory Lapses Or Loss] : no memory lapses or loss [Under Stress] : not under stress [Suicidal] : not suicidal [Easy Bleeding] : no tendency for easy bleeding

## 2021-10-04 NOTE — PHYSICAL EXAM
[General Appearance - Well Developed] : well developed [Normal Appearance] : normal appearance [Well Groomed] : well groomed [General Appearance - Well Nourished] : well nourished [No Deformities] : no deformities [General Appearance - In No Acute Distress] : no acute distress [Eyelids - No Xanthelasma] : the eyelids demonstrated no xanthelasmas [Normal Oral Mucosa] : normal oral mucosa [No Oral Pallor] : no oral pallor [No Oral Cyanosis] : no oral cyanosis [Normal Jugular Venous A Waves Present] : normal jugular venous A waves present [Normal Jugular Venous V Waves Present] : normal jugular venous V waves present [No Jugular Venous Moise A Waves] : no jugular venous moise A waves [Respiration, Rhythm And Depth] : normal respiratory rhythm and effort [Exaggerated Use Of Accessory Muscles For Inspiration] : no accessory muscle use [Auscultation Breath Sounds / Voice Sounds] : lungs were clear to auscultation bilaterally [Heart Rate And Rhythm] : heart rate and rhythm were normal [Heart Sounds] : normal S1 and S2 [Murmurs] : no murmurs present [Abdomen Soft] : soft [Abdomen Tenderness] : non-tender [Abdomen Mass (___ Cm)] : no abdominal mass palpated [Abnormal Walk] : normal gait [Gait - Sufficient For Exercise Testing] : the gait was sufficient for exercise testing [Nail Clubbing] : no clubbing of the fingernails [Cyanosis, Localized] : no localized cyanosis [Petechial Hemorrhages (___cm)] : no petechial hemorrhages [Skin Color & Pigmentation] : normal skin color and pigmentation [] : no rash [No Venous Stasis] : no venous stasis [Skin Lesions] : no skin lesions [No Skin Ulcers] : no skin ulcer [No Xanthoma] : no  xanthoma was observed [Oriented To Time, Place, And Person] : oriented to person, place, and time [Affect] : the affect was normal [Mood] : the mood was normal [No Anxiety] : not feeling anxious [Well Developed] : well developed [Well Nourished] : well nourished [No Acute Distress] : no acute distress [Normal Conjunctiva] : normal conjunctiva [Normal Venous Pressure] : normal venous pressure [No Carotid Bruit] : no carotid bruit [Normal S1, S2] : normal S1, S2 [No Murmur] : no murmur [No Rub] : no rub [Clear Lung Fields] : clear lung fields [Good Air Entry] : good air entry [No Respiratory Distress] : no respiratory distress  [Soft] : abdomen soft [Non Tender] : non-tender [No Masses/organomegaly] : no masses/organomegaly [Normal Gait] : normal gait [No Edema] : no edema [No Cyanosis] : no cyanosis [No Clubbing] : no clubbing [No Rash] : no rash [No Skin Lesions] : no skin lesions [Moves all extremities] : moves all extremities [No Focal Deficits] : no focal deficits [Normal Speech] : normal speech [Normal] : alert and oriented, normal memory [Alert and Oriented] : alert and oriented [Normal memory] : normal memory

## 2021-10-22 PROCEDURE — 93244 EXT ECG>48HR<7D REV&INTERPJ: CPT

## 2022-08-15 ENCOUNTER — APPOINTMENT (OUTPATIENT)
Dept: ULTRASOUND IMAGING | Facility: CLINIC | Age: 27
End: 2022-08-15

## 2022-08-15 PROCEDURE — 76536 US EXAM OF HEAD AND NECK: CPT

## 2022-10-03 ENCOUNTER — APPOINTMENT (OUTPATIENT)
Dept: ELECTROPHYSIOLOGY | Facility: CLINIC | Age: 27
End: 2022-10-03

## 2022-10-03 ENCOUNTER — NON-APPOINTMENT (OUTPATIENT)
Age: 27
End: 2022-10-03

## 2022-10-03 VITALS
HEART RATE: 62 BPM | WEIGHT: 174 LBS | HEIGHT: 66 IN | BODY MASS INDEX: 27.97 KG/M2 | DIASTOLIC BLOOD PRESSURE: 79 MMHG | SYSTOLIC BLOOD PRESSURE: 131 MMHG | OXYGEN SATURATION: 99 %

## 2022-10-03 DIAGNOSIS — R07.9 CHEST PAIN, UNSPECIFIED: ICD-10-CM

## 2022-10-03 PROCEDURE — 93000 ELECTROCARDIOGRAM COMPLETE: CPT

## 2022-10-03 PROCEDURE — 99214 OFFICE O/P EST MOD 30 MIN: CPT

## 2022-10-03 NOTE — PHYSICAL EXAM
[Well Developed] : well developed [Well Nourished] : well nourished [No Acute Distress] : no acute distress [Normal Venous Pressure] : normal venous pressure [No Carotid Bruit] : no carotid bruit [Normal S1, S2] : normal S1, S2 [No Murmur] : no murmur [No Rub] : no rub [Clear Lung Fields] : clear lung fields [Good Air Entry] : good air entry [No Respiratory Distress] : no respiratory distress  [Soft] : abdomen soft [Non Tender] : non-tender [No Masses/organomegaly] : no masses/organomegaly [Normal Gait] : normal gait [No Edema] : no edema [No Cyanosis] : no cyanosis [No Clubbing] : no clubbing [No Rash] : no rash [No Skin Lesions] : no skin lesions [Moves all extremities] : moves all extremities [No Focal Deficits] : no focal deficits [Normal Speech] : normal speech [Normal] : alert and oriented, normal memory [Alert and Oriented] : alert and oriented [Normal memory] : normal memory [General Appearance - Well Developed] : well developed [Normal Appearance] : normal appearance [Well Groomed] : well groomed [General Appearance - Well Nourished] : well nourished [No Deformities] : no deformities [General Appearance - In No Acute Distress] : no acute distress [Normal Conjunctiva] : the conjunctiva exhibited no abnormalities [Eyelids - No Xanthelasma] : the eyelids demonstrated no xanthelasmas [Normal Oral Mucosa] : normal oral mucosa [No Oral Pallor] : no oral pallor [No Oral Cyanosis] : no oral cyanosis [Normal Jugular Venous A Waves Present] : normal jugular venous A waves present [Normal Jugular Venous V Waves Present] : normal jugular venous V waves present [No Jugular Venous Moise A Waves] : no jugular venous miose A waves [Respiration, Rhythm And Depth] : normal respiratory rhythm and effort [Exaggerated Use Of Accessory Muscles For Inspiration] : no accessory muscle use [Auscultation Breath Sounds / Voice Sounds] : lungs were clear to auscultation bilaterally [Heart Rate And Rhythm] : heart rate and rhythm were normal [Heart Sounds] : normal S1 and S2 [Murmurs] : no murmurs present [Abdomen Soft] : soft [Abdomen Tenderness] : non-tender [Abdomen Mass (___ Cm)] : no abdominal mass palpated [Abnormal Walk] : normal gait [Gait - Sufficient For Exercise Testing] : the gait was sufficient for exercise testing [Nail Clubbing] : no clubbing of the fingernails [Cyanosis, Localized] : no localized cyanosis [Petechial Hemorrhages (___cm)] : no petechial hemorrhages [Skin Color & Pigmentation] : normal skin color and pigmentation [] : no rash [No Venous Stasis] : no venous stasis [Skin Lesions] : no skin lesions [No Skin Ulcers] : no skin ulcer [No Xanthoma] : no  xanthoma was observed [Oriented To Time, Place, And Person] : oriented to person, place, and time [Affect] : the affect was normal [Mood] : the mood was normal [No Anxiety] : not feeling anxious

## 2022-10-03 NOTE — HISTORY OF PRESENT ILLNESS
[FreeTextEntry1] : Contact number: [phone]\par \par Ms. Miller is a 1 year old female who is status post ablation of WPW (posteroseptal pathway) with Dr. Blanco in 2017. She was doing well following ablation but in May 2019 she began noting recurrent onset of palpitations where a Holter monitor was placed which revealed predominantly sinus rhythm with isolated supraventricular ectopies (<1%). She presents today for recurrent palpitations after receiving her first dose of Moderna vaccine on 6/18/21. She had an allergic reason to the vaccine where she had a swollen face and would get palpitations 2-3 times a week. Palpitations occur particularly when she's sleeping and would wake up in sweat. She has no dizziness, syncope or shortness of breath.  2-week Holter monitoring revealed symptom triggers for sinus rhythm/tachycardia and no arrhythmic episodes.\par \par Her symptoms subsequently resolved of their own accord.  2 months ago she began a new birth control and developed chest pain and left arm numbness.  She presented to her internist where she was told that her ECG was "abnormal".  She does not know what the actual results were.  Her symptoms again resolved on their own accord.

## 2022-10-03 NOTE — DISCUSSION/SUMMARY
[Patient] : the patient [FreeTextEntry1] : Ms. Miller is a 26 year old female with WPW and syncope s/p AP ablation 2017 and now an episode of isolated chest pain in the context of a new medication.  Her symptoms have long since resolved over 2 months ago.  I am unsure what was seen on her prior ECG but today's evaluation is normal and she has recent normal prolonged event monitoring.  I offered her reassurance and asked her to contact me should symptoms again recur.\par \par Ms. Miller appeared to understand the whole discussion and verbalized that all of her questions were answered to her satisfaction.\par \par Thank you for allowing me to be involved in the care of this pleasant woman. Please feel free to contact me with any questions. [EKG obtained to assist in diagnosis and management of assessed problem(s)] : EKG obtained to assist in diagnosis and management of assessed problem(s)

## 2022-12-02 NOTE — ED PROVIDER NOTE - CPE EDP [facility] NORM
Today's Plan:   1) Follow up in 6 months.   2) If you want us to refer you to sleep medicine, give us a call.     If you have questions or concerns please call my nurse team at [phone].     Scheduling phone number: [phone].  Reminder: Please bring in all current medications, over the counter supplements and vitamin bottles to your next appointment.    It was a pleasure seeing you today!            normal...

## 2025-02-18 ENCOUNTER — NON-APPOINTMENT (OUTPATIENT)
Age: 30
End: 2025-02-18

## 2025-02-18 ENCOUNTER — APPOINTMENT (OUTPATIENT)
Dept: ENDOCRINOLOGY | Facility: CLINIC | Age: 30
End: 2025-02-18
Payer: COMMERCIAL

## 2025-02-18 VITALS
DIASTOLIC BLOOD PRESSURE: 80 MMHG | HEIGHT: 66 IN | SYSTOLIC BLOOD PRESSURE: 106 MMHG | HEART RATE: 78 BPM | WEIGHT: 166 LBS | OXYGEN SATURATION: 99 % | BODY MASS INDEX: 26.68 KG/M2

## 2025-02-18 DIAGNOSIS — R94.6 ABNORMAL RESULTS OF THYROID FUNCTION STUDIES: ICD-10-CM

## 2025-02-18 PROCEDURE — 99204 OFFICE O/P NEW MOD 45 MIN: CPT

## 2025-02-19 LAB
ESTIMATED AVERAGE GLUCOSE: 114 MG/DL
HBA1C MFR BLD HPLC: 5.6 %
T4 FREE SERPL-MCNC: 1.1 NG/DL
TSH SERPL-ACNC: 1.58 UIU/ML

## 2025-04-29 ENCOUNTER — APPOINTMENT (OUTPATIENT)
Age: 30
End: 2025-04-29

## 2025-04-29 VITALS
TEMPERATURE: 98.1 F | DIASTOLIC BLOOD PRESSURE: 86 MMHG | OXYGEN SATURATION: 100 % | HEART RATE: 81 BPM | SYSTOLIC BLOOD PRESSURE: 138 MMHG

## 2025-04-29 DIAGNOSIS — J02.9 ACUTE PHARYNGITIS, UNSPECIFIED: ICD-10-CM

## 2025-04-29 RX ORDER — AZITHROMYCIN 250 MG/1
250 TABLET, FILM COATED ORAL
Qty: 6 | Refills: 0 | Status: ACTIVE | COMMUNITY
Start: 2025-04-29 | End: 1900-01-01

## 2025-05-30 ENCOUNTER — NON-APPOINTMENT (OUTPATIENT)
Age: 30
End: 2025-05-30

## 2025-06-04 ENCOUNTER — NON-APPOINTMENT (OUTPATIENT)
Age: 30
End: 2025-06-04

## 2025-06-04 ENCOUNTER — APPOINTMENT (OUTPATIENT)
Dept: CARDIOLOGY | Facility: CLINIC | Age: 30
End: 2025-06-04
Payer: COMMERCIAL

## 2025-06-04 VITALS
BODY MASS INDEX: 26.68 KG/M2 | SYSTOLIC BLOOD PRESSURE: 130 MMHG | HEART RATE: 67 BPM | OXYGEN SATURATION: 100 % | WEIGHT: 166 LBS | DIASTOLIC BLOOD PRESSURE: 82 MMHG | HEIGHT: 66 IN

## 2025-06-04 VITALS — DIASTOLIC BLOOD PRESSURE: 80 MMHG | SYSTOLIC BLOOD PRESSURE: 130 MMHG

## 2025-06-04 DIAGNOSIS — R42 DIZZINESS AND GIDDINESS: ICD-10-CM

## 2025-06-04 DIAGNOSIS — Z86.79 PERSONAL HISTORY OF OTHER DISEASES OF THE CIRCULATORY SYSTEM: ICD-10-CM

## 2025-06-04 DIAGNOSIS — R00.2 PALPITATIONS: ICD-10-CM

## 2025-06-04 PROCEDURE — 99203 OFFICE O/P NEW LOW 30 MIN: CPT | Mod: 25

## 2025-06-04 PROCEDURE — 93000 ELECTROCARDIOGRAM COMPLETE: CPT

## 2025-06-04 RX ORDER — NORETHINDRONE ACETATE AND ETHINYL ESTRADIOL, AND FERROUS FUMARATE 1MG-20(24)
KIT ORAL DAILY
Refills: 0 | Status: ACTIVE | COMMUNITY